# Patient Record
Sex: FEMALE | Race: WHITE | Employment: UNEMPLOYED | URBAN - METROPOLITAN AREA
[De-identification: names, ages, dates, MRNs, and addresses within clinical notes are randomized per-mention and may not be internally consistent; named-entity substitution may affect disease eponyms.]

---

## 2018-04-15 ENCOUNTER — HOSPITAL ENCOUNTER (EMERGENCY)
Age: 53
Discharge: HOME | End: 2018-04-15
Payer: COMMERCIAL

## 2018-04-15 ENCOUNTER — APPOINTMENT (OUTPATIENT)
Dept: GENERAL RADIOLOGY | Age: 53
End: 2018-04-15
Payer: COMMERCIAL

## 2018-04-15 VITALS
HEART RATE: 84 BPM | RESPIRATION RATE: 16 BRPM | HEIGHT: 62 IN | WEIGHT: 160 LBS | SYSTOLIC BLOOD PRESSURE: 110 MMHG | OXYGEN SATURATION: 98 % | BODY MASS INDEX: 29.44 KG/M2 | TEMPERATURE: 98.2 F | DIASTOLIC BLOOD PRESSURE: 66 MMHG

## 2018-04-15 DIAGNOSIS — M25.571 ACUTE RIGHT ANKLE PAIN: Primary | ICD-10-CM

## 2018-04-15 PROCEDURE — 6370000000 HC RX 637 (ALT 250 FOR IP): Performed by: PHYSICIAN ASSISTANT

## 2018-04-15 PROCEDURE — 99283 EMERGENCY DEPT VISIT LOW MDM: CPT

## 2018-04-15 PROCEDURE — 73610 X-RAY EXAM OF ANKLE: CPT

## 2018-04-15 RX ORDER — IBUPROFEN 800 MG/1
800 TABLET ORAL ONCE
Status: COMPLETED | OUTPATIENT
Start: 2018-04-15 | End: 2018-04-15

## 2018-04-15 RX ORDER — IBUPROFEN 800 MG/1
800 TABLET ORAL EVERY 6 HOURS PRN
Qty: 20 TABLET | Refills: 0 | Status: SHIPPED | OUTPATIENT
Start: 2018-04-15 | End: 2020-03-18 | Stop reason: ALTCHOICE

## 2018-04-15 RX ADMIN — IBUPROFEN 800 MG: 800 TABLET ORAL at 22:34

## 2018-04-15 ASSESSMENT — PAIN DESCRIPTION - ORIENTATION: ORIENTATION: RIGHT

## 2018-04-15 ASSESSMENT — PAIN DESCRIPTION - PROGRESSION: CLINICAL_PROGRESSION: GRADUALLY WORSENING

## 2018-04-15 ASSESSMENT — PAIN DESCRIPTION - ONSET: ONSET: SUDDEN

## 2018-04-15 ASSESSMENT — PAIN DESCRIPTION - LOCATION: LOCATION: ANKLE

## 2018-04-15 ASSESSMENT — PAIN SCALES - GENERAL
PAINLEVEL_OUTOF10: 6
PAINLEVEL_OUTOF10: 6

## 2018-04-15 ASSESSMENT — PAIN DESCRIPTION - FREQUENCY: FREQUENCY: CONTINUOUS

## 2018-04-15 ASSESSMENT — PAIN DESCRIPTION - PAIN TYPE: TYPE: ACUTE PAIN

## 2018-04-15 ASSESSMENT — PAIN DESCRIPTION - DESCRIPTORS: DESCRIPTORS: ACHING

## 2018-07-03 ENCOUNTER — APPOINTMENT (OUTPATIENT)
Dept: CT IMAGING | Age: 53
End: 2018-07-03
Payer: COMMERCIAL

## 2018-07-03 ENCOUNTER — HOSPITAL ENCOUNTER (EMERGENCY)
Age: 53
Discharge: HOME | End: 2018-07-03
Attending: EMERGENCY MEDICINE
Payer: COMMERCIAL

## 2018-07-03 VITALS
TEMPERATURE: 97.9 F | HEIGHT: 62 IN | WEIGHT: 164 LBS | OXYGEN SATURATION: 95 % | BODY MASS INDEX: 30.18 KG/M2 | SYSTOLIC BLOOD PRESSURE: 129 MMHG | HEART RATE: 69 BPM | DIASTOLIC BLOOD PRESSURE: 80 MMHG | RESPIRATION RATE: 16 BRPM

## 2018-07-03 DIAGNOSIS — M54.9 UPPER BACK PAIN ON RIGHT SIDE: Primary | ICD-10-CM

## 2018-07-03 DIAGNOSIS — M54.9 MID BACK PAIN: ICD-10-CM

## 2018-07-03 DIAGNOSIS — S29.012A MUSCLE STRAIN OF RIGHT UPPER BACK, INITIAL ENCOUNTER: ICD-10-CM

## 2018-07-03 LAB
ALBUMIN SERPL-MCNC: 4.4 G/DL (ref 3.5–5.2)
ALP BLD-CCNC: 87 U/L (ref 35–104)
ALT SERPL-CCNC: 22 U/L (ref 0–32)
AMORPHOUS: ABNORMAL
ANION GAP SERPL CALCULATED.3IONS-SCNC: 14 MMOL/L (ref 7–16)
AST SERPL-CCNC: 39 U/L (ref 0–31)
BACTERIA: ABNORMAL /HPF
BASOPHILS ABSOLUTE: 0.07 E9/L (ref 0–0.2)
BASOPHILS RELATIVE PERCENT: 0.8 % (ref 0–2)
BILIRUB SERPL-MCNC: 0.2 MG/DL (ref 0–1.2)
BILIRUBIN URINE: NEGATIVE
BLOOD, URINE: NEGATIVE
BUN BLDV-MCNC: 9 MG/DL (ref 6–20)
CALCIUM SERPL-MCNC: 9.6 MG/DL (ref 8.6–10.2)
CHLORIDE BLD-SCNC: 99 MMOL/L (ref 98–107)
CLARITY: ABNORMAL
CO2: 23 MMOL/L (ref 22–29)
COLOR: YELLOW
CREAT SERPL-MCNC: 0.7 MG/DL (ref 0.5–1)
EOSINOPHILS ABSOLUTE: 0.08 E9/L (ref 0.05–0.5)
EOSINOPHILS RELATIVE PERCENT: 0.9 % (ref 0–6)
EPITHELIAL CELLS, UA: ABNORMAL /HPF
GFR AFRICAN AMERICAN: >60
GFR NON-AFRICAN AMERICAN: >60 ML/MIN/1.73
GLUCOSE BLD-MCNC: 111 MG/DL (ref 74–109)
GLUCOSE URINE: NEGATIVE MG/DL
HCT VFR BLD CALC: 43 % (ref 34–48)
HEMOGLOBIN: 14.9 G/DL (ref 11.5–15.5)
IMMATURE GRANULOCYTES #: 0.03 E9/L
IMMATURE GRANULOCYTES %: 0.3 % (ref 0–5)
KETONES, URINE: NEGATIVE MG/DL
LEUKOCYTE ESTERASE, URINE: ABNORMAL
LYMPHOCYTES ABSOLUTE: 1.53 E9/L (ref 1.5–4)
LYMPHOCYTES RELATIVE PERCENT: 17.4 % (ref 20–42)
MCH RBC QN AUTO: 30.5 PG (ref 26–35)
MCHC RBC AUTO-ENTMCNC: 34.7 % (ref 32–34.5)
MCV RBC AUTO: 87.9 FL (ref 80–99.9)
MONOCYTES ABSOLUTE: 0.54 E9/L (ref 0.1–0.95)
MONOCYTES RELATIVE PERCENT: 6.2 % (ref 2–12)
NEUTROPHILS ABSOLUTE: 6.53 E9/L (ref 1.8–7.3)
NEUTROPHILS RELATIVE PERCENT: 74.4 % (ref 43–80)
NITRITE, URINE: NEGATIVE
PDW BLD-RTO: 12.4 FL (ref 11.5–15)
PH UA: 8.5 (ref 5–9)
PLATELET # BLD: 151 E9/L (ref 130–450)
PMV BLD AUTO: 11.4 FL (ref 7–12)
POTASSIUM SERPL-SCNC: 5 MMOL/L (ref 3.5–5)
PROTEIN UA: NEGATIVE MG/DL
RBC # BLD: 4.89 E12/L (ref 3.5–5.5)
RBC UA: ABNORMAL /HPF (ref 0–2)
SODIUM BLD-SCNC: 136 MMOL/L (ref 132–146)
SPECIFIC GRAVITY UA: 1.01 (ref 1–1.03)
TOTAL PROTEIN: 7.8 G/DL (ref 6.4–8.3)
UROBILINOGEN, URINE: 0.2 E.U./DL
WBC # BLD: 8.8 E9/L (ref 4.5–11.5)
WBC UA: ABNORMAL /HPF (ref 0–5)

## 2018-07-03 PROCEDURE — 85025 COMPLETE CBC W/AUTO DIFF WBC: CPT

## 2018-07-03 PROCEDURE — 36415 COLL VENOUS BLD VENIPUNCTURE: CPT

## 2018-07-03 PROCEDURE — 96374 THER/PROPH/DIAG INJ IV PUSH: CPT

## 2018-07-03 PROCEDURE — 80053 COMPREHEN METABOLIC PANEL: CPT

## 2018-07-03 PROCEDURE — 2580000003 HC RX 258: Performed by: NURSE PRACTITIONER

## 2018-07-03 PROCEDURE — 6360000002 HC RX W HCPCS: Performed by: EMERGENCY MEDICINE

## 2018-07-03 PROCEDURE — 81001 URINALYSIS AUTO W/SCOPE: CPT

## 2018-07-03 PROCEDURE — 74176 CT ABD & PELVIS W/O CONTRAST: CPT

## 2018-07-03 PROCEDURE — 99284 EMERGENCY DEPT VISIT MOD MDM: CPT

## 2018-07-03 PROCEDURE — 96375 TX/PRO/DX INJ NEW DRUG ADDON: CPT

## 2018-07-03 RX ORDER — NAPROXEN 500 MG/1
500 TABLET ORAL 2 TIMES DAILY WITH MEALS
Qty: 60 TABLET | Refills: 0 | Status: SHIPPED | OUTPATIENT
Start: 2018-07-03 | End: 2018-09-17 | Stop reason: ALTCHOICE

## 2018-07-03 RX ORDER — MORPHINE SULFATE 4 MG/ML
4 INJECTION, SOLUTION INTRAMUSCULAR; INTRAVENOUS ONCE
Status: COMPLETED | OUTPATIENT
Start: 2018-07-03 | End: 2018-07-03

## 2018-07-03 RX ORDER — METHOCARBAMOL 500 MG/1
500 TABLET, FILM COATED ORAL 4 TIMES DAILY
Qty: 40 TABLET | Refills: 0 | Status: SHIPPED | OUTPATIENT
Start: 2018-07-03 | End: 2018-07-13

## 2018-07-03 RX ORDER — 0.9 % SODIUM CHLORIDE 0.9 %
500 INTRAVENOUS SOLUTION INTRAVENOUS ONCE
Status: COMPLETED | OUTPATIENT
Start: 2018-07-03 | End: 2018-07-03

## 2018-07-03 RX ORDER — ONDANSETRON 2 MG/ML
4 INJECTION INTRAMUSCULAR; INTRAVENOUS ONCE
Status: COMPLETED | OUTPATIENT
Start: 2018-07-03 | End: 2018-07-03

## 2018-07-03 RX ORDER — KETOROLAC TROMETHAMINE 30 MG/ML
15 INJECTION, SOLUTION INTRAMUSCULAR; INTRAVENOUS ONCE
Status: DISCONTINUED | OUTPATIENT
Start: 2018-07-03 | End: 2018-07-03

## 2018-07-03 RX ORDER — KETOROLAC TROMETHAMINE 30 MG/ML
30 INJECTION, SOLUTION INTRAMUSCULAR; INTRAVENOUS ONCE
Status: COMPLETED | OUTPATIENT
Start: 2018-07-03 | End: 2018-07-03

## 2018-07-03 RX ORDER — MORPHINE SULFATE 4 MG/ML
4 INJECTION, SOLUTION INTRAMUSCULAR; INTRAVENOUS ONCE
Status: DISCONTINUED | OUTPATIENT
Start: 2018-07-03 | End: 2018-07-03

## 2018-07-03 RX ORDER — HYDROCODONE BITARTRATE AND ACETAMINOPHEN 5; 325 MG/1; MG/1
1 TABLET ORAL EVERY 6 HOURS PRN
Qty: 10 TABLET | Refills: 0 | Status: SHIPPED | OUTPATIENT
Start: 2018-07-03 | End: 2018-07-06

## 2018-07-03 RX ADMIN — ONDANSETRON 4 MG: 2 INJECTION INTRAMUSCULAR; INTRAVENOUS at 16:45

## 2018-07-03 RX ADMIN — MORPHINE SULFATE 4 MG: 4 INJECTION INTRAVENOUS at 17:27

## 2018-07-03 RX ADMIN — SODIUM CHLORIDE 500 ML: 9 INJECTION, SOLUTION INTRAVENOUS at 16:41

## 2018-07-03 RX ADMIN — KETOROLAC TROMETHAMINE 30 MG: 30 INJECTION, SOLUTION INTRAMUSCULAR at 16:47

## 2018-07-03 ASSESSMENT — PAIN SCALES - GENERAL
PAINLEVEL_OUTOF10: 7
PAINLEVEL_OUTOF10: 8
PAINLEVEL_OUTOF10: 8

## 2018-07-03 ASSESSMENT — PAIN DESCRIPTION - PAIN TYPE: TYPE: ACUTE PAIN

## 2018-07-03 ASSESSMENT — ENCOUNTER SYMPTOMS
BACK PAIN: 1
VOMITING: 0
COUGH: 0
SHORTNESS OF BREATH: 0
ABDOMINAL PAIN: 0
NAUSEA: 1
BLOOD IN STOOL: 0

## 2018-07-03 ASSESSMENT — PAIN DESCRIPTION - PROGRESSION: CLINICAL_PROGRESSION: NOT CHANGED

## 2018-07-03 ASSESSMENT — PAIN DESCRIPTION - LOCATION: LOCATION: FLANK

## 2018-07-03 ASSESSMENT — PAIN DESCRIPTION - FREQUENCY: FREQUENCY: CONTINUOUS

## 2018-07-03 ASSESSMENT — PAIN DESCRIPTION - ORIENTATION: ORIENTATION: RIGHT

## 2018-07-03 ASSESSMENT — PAIN DESCRIPTION - DESCRIPTORS: DESCRIPTORS: CRUSHING

## 2018-07-03 NOTE — ED PROVIDER NOTES
Differential   Result Value Ref Range    WBC 8.8 4.5 - 11.5 E9/L    RBC 4.89 3.50 - 5.50 E12/L    Hemoglobin 14.9 11.5 - 15.5 g/dL    Hematocrit 43.0 34.0 - 48.0 %    MCV 87.9 80.0 - 99.9 fL    MCH 30.5 26.0 - 35.0 pg    MCHC 34.7 (H) 32.0 - 34.5 %    RDW 12.4 11.5 - 15.0 fL    Platelets 864 142 - 946 E9/L    MPV 11.4 7.0 - 12.0 fL    Neutrophils % 74.4 43.0 - 80.0 %    Immature Granulocytes % 0.3 0.0 - 5.0 %    Lymphocytes % 17.4 (L) 20.0 - 42.0 %    Monocytes % 6.2 2.0 - 12.0 %    Eosinophils % 0.9 0.0 - 6.0 %    Basophils % 0.8 0.0 - 2.0 %    Neutrophils # 6.53 1.80 - 7.30 E9/L    Immature Granulocytes # 0.03 E9/L    Lymphocytes # 1.53 1.50 - 4.00 E9/L    Monocytes # 0.54 0.10 - 0.95 E9/L    Eosinophils # 0.08 0.05 - 0.50 E9/L    Basophils # 0.07 0.00 - 0.20 E9/L   Comprehensive Metabolic Panel   Result Value Ref Range    Sodium 136 132 - 146 mmol/L    Potassium 5.0 3.5 - 5.0 mmol/L    Chloride 99 98 - 107 mmol/L    CO2 23 22 - 29 mmol/L    Anion Gap 14 7 - 16 mmol/L    Glucose 111 (H) 74 - 109 mg/dL    BUN 9 6 - 20 mg/dL    CREATININE 0.7 0.5 - 1.0 mg/dL    GFR Non-African American >60 >=60 mL/min/1.73    GFR African American >60     Calcium 9.6 8.6 - 10.2 mg/dL    Total Protein 7.8 6.4 - 8.3 g/dL    Alb 4.4 3.5 - 5.2 g/dL    Total Bilirubin 0.2 0.0 - 1.2 mg/dL    Alkaline Phosphatase 87 35 - 104 U/L    ALT 22 0 - 32 U/L    AST 39 (H) 0 - 31 U/L   Urinalysis   Result Value Ref Range    Color, UA Yellow Straw/Yellow    Clarity, UA SLCLOUDY Clear    Glucose, Ur Negative Negative mg/dL    Bilirubin Urine Negative Negative    Ketones, Urine Negative Negative mg/dL    Specific Gravity, UA 1.015 1.005 - 1.030    Blood, Urine Negative Negative    pH, UA 8.5 5.0 - 9.0    Protein, UA Negative Negative mg/dL    Urobilinogen, Urine 0.2 <2.0 E.U./dL    Nitrite, Urine Negative Negative    Leukocyte Esterase, Urine TRACE (A) Negative   Microscopic Urinalysis   Result Value Ref Range    WBC, UA 1-3 0 - 5 /HPF    RBC, UA NONE 0 - 2 /HPF    Epi Cells FEW /HPF    Bacteria, UA MANY (A) /HPF    Amorphous, UA MODERATE        Radiology:  CT ABDOMEN PELVIS WO CONTRAST Additional Contrast? None   Final Result          ------------------------- NURSING NOTES AND VITALS REVIEWED ---------------------------  Date / Time Roomed:  7/3/2018  3:50 PM  ED Bed Assignment:  12/12    The nursing notes within the ED encounter and vital signs as below have been reviewed. /80   Pulse 69   Temp 97.9 °F (36.6 °C)   Resp 16   Ht 5' 2\" (1.575 m)   Wt 164 lb (74.4 kg)   LMP 05/09/2016 (Within Weeks)   SpO2 95%   BMI 30.00 kg/m²   Oxygen Saturation Interpretation: Normal      ------------------------------------------ PROGRESS NOTES ------------------------------------------  6:06 PM  I have spoken with the patient and discussed todays results, in addition to providing specific details for the plan of care and counseling regarding the diagnosis and prognosis. Their questions are answered at this time and they are agreeable with the plan. I discussed at length with them reasons for immediate return here for re evaluation. They will followup with their primary care physician by calling their office tomorrow. --------------------------------- ADDITIONAL PROVIDER NOTES ---------------------------------  At this time the patient is without objective evidence of an acute process requiring hospitalization or inpatient management. They have remained hemodynamically stable throughout their entire ED visit and are stable for discharge with outpatient follow-up. The plan has been discussed in detail and they are aware of the specific conditions for emergent return, as well as the importance of follow-up. Discharge Medication List as of 7/3/2018  5:55 PM      START taking these medications    Details   HYDROcodone-acetaminophen (NORCO) 5-325 MG per tablet Take 1 tablet by mouth every 6 hours as needed for Pain for up to 3 days.  Intended supply: 3

## 2018-12-17 PROBLEM — E66.9 OBESITY (BMI 30-39.9): Status: ACTIVE | Noted: 2018-12-17

## 2019-07-22 ENCOUNTER — HOSPITAL ENCOUNTER (OUTPATIENT)
Age: 54
Discharge: HOME | End: 2019-07-24
Payer: COMMERCIAL

## 2019-07-22 PROCEDURE — 88305 TISSUE EXAM BY PATHOLOGIST: CPT

## 2020-01-15 ENCOUNTER — OFFICE VISIT (OUTPATIENT)
Dept: BARIATRICS/WEIGHT MGMT | Age: 55
End: 2020-01-15
Payer: COMMERCIAL

## 2020-01-15 VITALS
HEIGHT: 61 IN | WEIGHT: 170.6 LBS | HEART RATE: 70 BPM | TEMPERATURE: 97.9 F | BODY MASS INDEX: 32.21 KG/M2 | DIASTOLIC BLOOD PRESSURE: 89 MMHG | SYSTOLIC BLOOD PRESSURE: 140 MMHG

## 2020-01-15 PROCEDURE — G8417 CALC BMI ABV UP PARAM F/U: HCPCS | Performed by: INTERNAL MEDICINE

## 2020-01-15 PROCEDURE — 99204 OFFICE O/P NEW MOD 45 MIN: CPT | Performed by: INTERNAL MEDICINE

## 2020-01-15 PROCEDURE — 3017F COLORECTAL CA SCREEN DOC REV: CPT | Performed by: INTERNAL MEDICINE

## 2020-01-15 PROCEDURE — 99202 OFFICE O/P NEW SF 15 MIN: CPT

## 2020-01-15 PROCEDURE — G8484 FLU IMMUNIZE NO ADMIN: HCPCS | Performed by: INTERNAL MEDICINE

## 2020-01-15 PROCEDURE — G9899 SCRN MAM PERF RSLTS DOC: HCPCS | Performed by: INTERNAL MEDICINE

## 2020-01-15 PROCEDURE — G8427 DOCREV CUR MEDS BY ELIG CLIN: HCPCS | Performed by: INTERNAL MEDICINE

## 2020-01-15 PROCEDURE — 1036F TOBACCO NON-USER: CPT | Performed by: INTERNAL MEDICINE

## 2020-01-15 NOTE — PROGRESS NOTES
CC -  Low back pain, Obesity    Background -   Medical problems: obesity, pre-HTN, migraines, low back pain, depression, restless leg syndrome  Medications: Gabapentin, Prozac, Maxalt, vaginal estrogen, Saxenda, ibuprofen    Low back pain:  Present x 1yr, bilateral, associated with bilateral sciatica, constant, 7/10, worse with lifting heavy objects, NSAIDs do not help, excess wt may be contributing to it  Obesity: Present since 2016 BMI 32.22, Wt 170.6 lbs; HS grad wt: 105 lbs; Highest wt 180 lbs, Lowest wt 105 lbs; Pattern of wt gain: grad; Wt change past yr: fluctuates 10 lbs by home scales; Most wt lost: 10 lbs (Kevenda, eating right: Other diets: none  Started Saxenda one year ago, but has only taken 1.8 mg/day, no side effects and appetite suppression is 3/10     Diet:    Number of meals per day - 2    Number of snacks per day - 2    Meal volume - 12\" plate, no seconds    Fast food/convenience store - 1x/week    Restaurants (not fast food) - 1-2x/week   Sweets - 7d/week (Tunde cups - 12-16 miniature size/day 44 marry/piece)   Chips - 0d/week   Crackers/pretzels - 1d/week (large pretzel ethel)   Nuts - 0d/week   Peanut Butter - 7d/week (on bread)   Popcorn - 0d/week   Dried fruit - 0d/week   Whole fruit - 7d/week (one piece)   Breakfast cereal - 0d/week   Granola/Protein/Energy bar - 0d/week   Sugar sweetened beverages - 16-24 oz reg soda/week   Protein - No supplements   Fiber - No supplements     Exercise:    Gym membership - none, but has a gym at work    Walking - 30 min/day, 5 day/week    Running - none    Resistance - none    Aerobic class - none    Vital signs: BP (!) 156/86 (Site: Left Upper Arm, Position: Sitting, Cuff Size: Medium Adult)   Pulse 79   Temp 97.9 °F (36.6 °C)   Ht 5' 1\" (1.549 m)   Wt 170 lb 9.6 oz (77.4 kg)   LMP 05/09/2016 (Within Weeks)   BMI 32.23 kg/m²     I spent 45 minutes in a face to face encounter with Red Lance today.    All of this was in education and counseling

## 2020-01-15 NOTE — PATIENT INSTRUCTIONS
Rules:  · Count every calorie every day  · Limit sweets to one day per month  · Eliminate all sugar sweetened beverages  · Limit restaurants (including fast food and food from a convenience store) to one time every two weeks    Requirements:  · Make sure protein intake is at least 60 grams per day (Consider using a protein shake - Nectar, Pure Protein, Premier, Boost Max, Ensure Max, BeneProtein and [employer] (which is lactose-free) are milk-based options; Nectar, Premier Protein Clear, IsoPure Protein Drink, and Protein 2 O are water-based options; Quest (or Cosco, which is cheaper and is ordered on [employer]) and the [employer] 1 protein bars can also be used, but have less protein in them ); other drink options can be found in the protein powder report on the [address] [place] [address] website  · Make sure that fiber intake is at least 22 grams per day. Do this by either eating 12 tablespoons of the original, plain Fiber One cereal every day or 4 tablespoons of Benefiber every day.  Work up to this amount slowly by starting with only one-fourth of the target amount and adding another one-fourth every one or two weeks  · Take one multivitamin every day    Targets:  · Limit calorie intake to 1200 calories/day  · Walk 30 minutes daily  · Establish 16 hours of food-free periods each day - no period can be less than 1 hours, the periods need to be the same every day for days that are the same (for example, workdays would have one set of food free periods and weekends would have another set of days), the usual sleep time should be included (the \"usual\" time you go to bed until the \"usual\" time you get up)  · Avoid eating 2 hours within bedtime    Tips:  · Do not eat outside of the dining room or the kitchen  · Do not eat while watching TV, videos, working on the computer or using a smart phone  · Never eat food out of the bag or container (unless it is a single serving bag)    Medications:  · Increase the Saxenda daily dose by 0.6 mg every week until reaching 3 mg/daily    Return to see Dr. Joao Nascimento in 2 months

## 2020-03-18 ENCOUNTER — OFFICE VISIT (OUTPATIENT)
Dept: BARIATRICS/WEIGHT MGMT | Age: 55
End: 2020-03-18
Payer: COMMERCIAL

## 2020-03-18 VITALS
BODY MASS INDEX: 30.66 KG/M2 | HEIGHT: 61 IN | SYSTOLIC BLOOD PRESSURE: 143 MMHG | TEMPERATURE: 97.6 F | HEART RATE: 71 BPM | WEIGHT: 162.4 LBS | DIASTOLIC BLOOD PRESSURE: 84 MMHG

## 2020-03-18 PROCEDURE — G8484 FLU IMMUNIZE NO ADMIN: HCPCS | Performed by: INTERNAL MEDICINE

## 2020-03-18 PROCEDURE — G8427 DOCREV CUR MEDS BY ELIG CLIN: HCPCS | Performed by: INTERNAL MEDICINE

## 2020-03-18 PROCEDURE — 99213 OFFICE O/P EST LOW 20 MIN: CPT | Performed by: INTERNAL MEDICINE

## 2020-03-18 PROCEDURE — 1036F TOBACCO NON-USER: CPT | Performed by: INTERNAL MEDICINE

## 2020-03-18 PROCEDURE — 3017F COLORECTAL CA SCREEN DOC REV: CPT | Performed by: INTERNAL MEDICINE

## 2020-03-18 PROCEDURE — G8417 CALC BMI ABV UP PARAM F/U: HCPCS | Performed by: INTERNAL MEDICINE

## 2020-03-18 PROCEDURE — G9899 SCRN MAM PERF RSLTS DOC: HCPCS | Performed by: INTERNAL MEDICINE

## 2020-03-18 PROCEDURE — 99211 OFF/OP EST MAY X REQ PHY/QHP: CPT | Performed by: INTERNAL MEDICINE

## 2020-03-18 NOTE — PATIENT INSTRUCTIONS
Rules:  · Count every calorie every day  · Limit sweets to one day per month  · Eliminate all sugar sweetened beverages  · Limit restaurants (including fast food and food from a convenience store) to one time every two weeks    Requirements:  · Make sure protein intake is at least 60 grams per day (Consider using a protein shake - Nectar, Pure Protein, Premier, Boost Max, Ensure Max, BeneProtein and [employer] (which is lactose-free) are milk-based options; Nectar, Premier Protein Clear, IsoPure Protein Drink, and Protein 2 O are water-based options; Quest (or Cosco, which is cheaper and is ordered on [employer]) and the [employer] 1 protein bars can also be used, but have less protein in them ); other drink options can be found in the protein powder report on the [address] [place] [address] website  · Make sure that fiber intake is at least 22 grams per day. Do this by either eating 12 tablespoons of the original, plain Fiber One cereal every day or 4 tablespoons of Benefiber every day.  Work up to this amount slowly by starting with only one-fourth of the target amount and adding another one-fourth every one or two weeks  · Take one multivitamin every day    Targets:  · Limit calorie intake to 1200 calories/day  · Walk 30 minutes daily  · Establish 16 hours of food-free periods each day - no period can be less than 1 hours, the periods need to be the same every day for days that are the same (for example, workdays would have one set of food free periods and weekends would have another set of days), the usual sleep time should be included (the \"usual\" time you go to bed until the \"usual\" time you get up)  · Avoid eating 2 hours within bedtime    Tips:  · Do not eat outside of the dining room or the kitchen  · Do not eat while watching TV, videos, working on the computer or using a smart phone  · Never eat food out of the bag or container (unless it is a single serving bag)    Medications:  · Saxenda 3 mg/daily

## 2020-06-28 ENCOUNTER — HOSPITAL ENCOUNTER (EMERGENCY)
Age: 55
Discharge: HOME | End: 2020-06-28
Attending: EMERGENCY MEDICINE
Payer: COMMERCIAL

## 2020-06-28 VITALS
DIASTOLIC BLOOD PRESSURE: 80 MMHG | SYSTOLIC BLOOD PRESSURE: 134 MMHG | TEMPERATURE: 98.9 F | HEART RATE: 80 BPM | HEIGHT: 61 IN | RESPIRATION RATE: 16 BRPM | OXYGEN SATURATION: 98 % | BODY MASS INDEX: 30.21 KG/M2 | WEIGHT: 160 LBS

## 2020-06-28 PROCEDURE — 96374 THER/PROPH/DIAG INJ IV PUSH: CPT

## 2020-06-28 PROCEDURE — 6360000002 HC RX W HCPCS: Performed by: EMERGENCY MEDICINE

## 2020-06-28 PROCEDURE — 99283 EMERGENCY DEPT VISIT LOW MDM: CPT

## 2020-06-28 PROCEDURE — 96375 TX/PRO/DX INJ NEW DRUG ADDON: CPT

## 2020-06-28 RX ORDER — METHYLPREDNISOLONE SODIUM SUCCINATE 125 MG/2ML
125 INJECTION, POWDER, LYOPHILIZED, FOR SOLUTION INTRAMUSCULAR; INTRAVENOUS ONCE
Status: COMPLETED | OUTPATIENT
Start: 2020-06-28 | End: 2020-06-28

## 2020-06-28 RX ORDER — EPINEPHRINE 0.3 MG/.3ML
INJECTION SUBCUTANEOUS
Qty: 2 EACH | Refills: 2 | Status: SHIPPED | OUTPATIENT
Start: 2020-06-28

## 2020-06-28 RX ORDER — DIPHENHYDRAMINE HYDROCHLORIDE 50 MG/ML
25 INJECTION INTRAMUSCULAR; INTRAVENOUS ONCE
Status: COMPLETED | OUTPATIENT
Start: 2020-06-28 | End: 2020-06-28

## 2020-06-28 RX ADMIN — METHYLPREDNISOLONE SODIUM SUCCINATE 125 MG: 125 INJECTION, POWDER, FOR SOLUTION INTRAMUSCULAR; INTRAVENOUS at 14:52

## 2020-06-28 RX ADMIN — DIPHENHYDRAMINE HYDROCHLORIDE 25 MG: 50 INJECTION, SOLUTION INTRAMUSCULAR; INTRAVENOUS at 14:52

## 2020-06-28 NOTE — ED PROVIDER NOTES
diphenhydrAMINE (BENADRYL) injection 25 mg (25 mg Intravenous Given 6/28/20 9525)           The cardiac monitor revealed normal sinus rhythm with a heart rate in the 80s as interpreted by me. The cardiac monitor was ordered secondary to the patient's anaphylaxis and to monitor the patient for dysrhythmia. CPT [id number]       I, Dr. Lashay Pang, am the primary provider of record    Medical Decision Making:   Anaphylaxis, she treated with epinephrine prior to arrival.  She is already improving. IV fluids, Benadryl, steroids given. All of her symptoms have resolved. She was observed in the ED without any return of symptoms. She did not want to wait any longer and wanted to leave. I discussed that typically I watch people for several hours after receiving epinephrine as there is a chance of a biphasic reaction in which her anaphylaxis could return. She understands this but still wants to leave. She says she will return or go to the closest facility if this happens again. Again I discussed that I prefer her to stay but she does not want to do so and understands the risks. Oxygen Saturation Interpretation: 98 % on RA. Normal      Re-Evaluations:     Improving  All hives gone  Breathing normally  Feeling fine, wants to go home  Went to the bathroom without issues  Not short of breath      This patient's ED course included: a personal history and physicial examination, re-evaluation prior to disposition, multiple bedside re-evaluations, IV medications, cardiac monitoring, continuous pulse oximetry and complex medical decision making and emergency management    This patient has remained hemodynamically stable during their ED course. Counseling: The emergency provider has spoken with the patient and spouse/SO and discussed todays results, in addition to providing specific details for the plan of care and counseling regarding the diagnosis and prognosis.   Questions are answered at this time and they are

## 2020-07-06 ENCOUNTER — OFFICE VISIT (OUTPATIENT)
Dept: BARIATRICS/WEIGHT MGMT | Age: 55
End: 2020-07-06
Payer: COMMERCIAL

## 2020-07-06 VITALS
DIASTOLIC BLOOD PRESSURE: 79 MMHG | SYSTOLIC BLOOD PRESSURE: 141 MMHG | BODY MASS INDEX: 29.07 KG/M2 | HEIGHT: 61 IN | TEMPERATURE: 98.2 F | HEART RATE: 78 BPM | WEIGHT: 154 LBS

## 2020-07-06 PROCEDURE — 99213 OFFICE O/P EST LOW 20 MIN: CPT | Performed by: INTERNAL MEDICINE

## 2020-07-06 PROCEDURE — G8427 DOCREV CUR MEDS BY ELIG CLIN: HCPCS | Performed by: INTERNAL MEDICINE

## 2020-07-06 PROCEDURE — 1036F TOBACCO NON-USER: CPT | Performed by: INTERNAL MEDICINE

## 2020-07-06 PROCEDURE — 3017F COLORECTAL CA SCREEN DOC REV: CPT | Performed by: INTERNAL MEDICINE

## 2020-07-06 PROCEDURE — 99211 OFF/OP EST MAY X REQ PHY/QHP: CPT

## 2020-07-06 PROCEDURE — G9899 SCRN MAM PERF RSLTS DOC: HCPCS | Performed by: INTERNAL MEDICINE

## 2020-07-06 PROCEDURE — G8417 CALC BMI ABV UP PARAM F/U: HCPCS | Performed by: INTERNAL MEDICINE

## 2020-07-06 NOTE — PATIENT INSTRUCTIONS
Rules:  · Count every calorie every day  · Limit sweets to one day per month  · Eliminate all sugar sweetened beverages  · Limit restaurants (including fast food and food from a convenience store) to one time every two weeks    Requirements:  · Make sure protein intake is at least 60 grams per day (Consider using a protein shake - Nectar, Pure Protein, Premier, Boost Max, Ensure Max, BeneProtein and [employer] (which is lactose-free) are milk-based options; Nectar, Premier Protein Clear, IsoPure Protein Drink, and Protein 2 O are water-based options; Quest (or Cosco, which is cheaper and is ordered on [address] [facility] [address]) and the [employer] 1 protein bars can also be used, but have less protein in them ); other drink options can be found in the protein powder report on the [address] [place] [address] website  · Make sure that fiber intake is at least 22 grams per day. Do this by either eating 12 tablespoons of the original, plain Fiber One cereal every day or 4 tablespoons of Benefiber every day.  Work up to this amount slowly by starting with only one-fourth of the target amount and adding another one-fourth every one or two weeks  · Take one multivitamin every day    Targets:  · Limit calorie intake to 1200 calories/day  · Walk 30 minutes daily  · Establish 16 hours of food-free periods each day - no period can be less than 1 hours, the periods need to be the same every day for days that are the same (for example, workdays would have one set of food free periods and weekends would have another set of days), the usual sleep time should be included (the \"usual\" time you go to bed until the \"usual\" time you get up)  · Avoid eating 2 hours within bedtime    Tips:  · Do not eat outside of the dining room or the kitchen  · Do not eat while watching TV, videos, working on the computer or using a smart phone  · Never eat food out of the bag or container (unless it is a single serving bag)    Medications:  · Saxenda 3 mg/daily

## 2020-07-06 NOTE — PROGRESS NOTES
CC -   Low back pain, Obesity    Background -   Pt returns for follow up of last visit with me on 1/15/2020    Low back pain:  Present x 1yr, bilateral, associated with bilateral sciatica, constant, 7/10, worse with lifting heavy objects, NSAIDs do not help, excess wt may be contributing to it  Obesity: Present since 2016 BMI 32.22, Wt 170.6 lbs; HS grad wt: 105 lbs; Highest wt 180 lbs, Lowest wt 105 lbs; Pattern of wt gain: grad; Wt change past yr: fluctuates 10 lbs by home scales; Most wt lost: 10 lbs (Saxenda, eating right: Other diets: none  Taking Saxenda 3mg daily     Diet:    Number of meals per day - 2    Number of snacks per day - 2    Meal volume - 12\" plate, no seconds    Fast food/convenience store - 1x/week    Restaurants (not fast food) - 1-2x/week   Sweets - 7d/week (Tunde cups - 12-16 miniature size/day 44 marry/piece)   Chips - 0d/week   Crackers/pretzels - 1d/week (large pretzel ethel)   Nuts - 0d/week   Peanut Butter - 7d/week (on bread)   Popcorn - 0d/week   Dried fruit - 0d/week   Whole fruit - 7d/week (one piece)   Breakfast cereal - 0d/week   Granola/Protein/Energy bar - 0d/week   Sugar sweetened beverages - 16-24 oz reg soda/week  ______________________    [facility] [place] [facility] -  Medical problems: obesity, pre-HTN, migraines, low back pain, depression, restless leg syndrome  Medications: Gabapentin, Prozac, Maxalt, vaginal estrogen, Saxenda, ibuprofen    ROS -  Gen: no calories  Pulm: no cough    PE -  Gen : BP (!) 141/79 (Site: Left Upper Arm, Position: Sitting, Cuff Size: Medium Adult)   Pulse 78   Temp 98.2 °F (36.8 °C) (Temporal)   Ht 5' 1\" (1.549 m)   Wt 154 lb (69.9 kg)   LMP 05/09/2016 (Within Weeks)   BMI 29.10 kg/m²    WN, WD, NAD  Lung: Nml resp effort  Psych: Normal mood   Full affect  Neuro: Moves all ext well  ______________________      HPI & A/P -   Problem 1  - Low back pain   HPI   - Presently had 0/10 pain.  She attributes this to her weight reduction  Assessment  - Improving  Plan   - Cont with the prescribed weight loss regimen    Problem 2  - Obesity  HPI   - Weight  Date      170.6 lbs 1/15/20      162.4 lbs 3/18/20      154.0 lbs 7/06/20      Total wt loss to date: 16.6 lbs      Counting calories 7d/week, 1200 marry/d    Eating restaurant food once every week (6\" pizza)    Eating sweets 1x/2 weeks    Walking 30 min/day    Taking 3 mg Saxenda daily. Appetite suppression 9/10. No side effects - no GI effects (mild nausea cont's) or palpitations    Assessment  - Improving; we discussed the importance of elimination  Plan   -   Rules:  · Count every calorie every day  · Limit sweets to one day per month  · Eliminate all sugar sweetened beverages  · Limit restaurants (including fast food and food from a convenience store) to one time every two weeks    Requirements:  · Make sure protein intake is at least 60 grams per day (Consider using a protein shake - Nectar, Pure Protein, Premier, Boost Max, Ensure Max, BeneProtein and [employer] (which is lactose-free) are milk-based options; Nectar, Premier Protein Clear, IsoPure Protein Drink, and Protein 2 O are water-based options; Quest (or Cosco, which is cheaper and is ordered on [address] [facility] [address]) and the [employer] 1 protein bars can also be used, but have less protein in them ); other drink options can be found in the protein powder report on the [address] [place] [address] website  · Make sure that fiber intake is at least 22 grams per day. Do this by either eating 12 tablespoons of the original, plain Fiber One cereal every day or 4 tablespoons of Benefiber every day.  Work up to this amount slowly by starting with only one-fourth of the target amount and adding another one-fourth every one or two weeks  · Take one multivitamin every day    Targets:  · Limit calorie intake to 1200 calories/day  · Walk 30 minutes daily  · Establish 16 hours of food-free periods each day - no period can be less than 1 hours, the periods need to be the same every day for days that are the same (for example, workdays would have one set of food free periods and weekends would have another set of days), the usual sleep time should be included (the \"usual\" time you go to bed until the \"usual\" time you get up)  · Avoid eating 2 hours within bedtime    Tips:  · Do not eat outside of the dining room or the kitchen  · Do not eat while watching TV, videos, working on the computer or using a smart phone  · Never eat food out of the bag or container (unless it is a single serving bag)    Medications:  · Saxenda 3 mg/daily    Follow up  Return to see me in 6 weeks (per pt request)      Barrie Fraser MD   [facility]  7/6/20

## 2020-08-06 DIAGNOSIS — G89.29 CHRONIC BILATERAL LOW BACK PAIN WITH BILATERAL SCIATICA: ICD-10-CM

## 2020-08-06 DIAGNOSIS — E66.09 CLASS 1 OBESITY DUE TO EXCESS CALORIES WITHOUT SERIOUS COMORBIDITY WITH BODY MASS INDEX (BMI) OF 32.0 TO 32.9 IN ADULT: ICD-10-CM

## 2020-08-06 DIAGNOSIS — M54.42 CHRONIC BILATERAL LOW BACK PAIN WITH BILATERAL SCIATICA: ICD-10-CM

## 2020-08-06 DIAGNOSIS — M54.41 CHRONIC BILATERAL LOW BACK PAIN WITH BILATERAL SCIATICA: ICD-10-CM

## 2020-08-06 RX ORDER — LIRAGLUTIDE 6 MG/ML
INJECTION, SOLUTION SUBCUTANEOUS
Qty: 5 PEN | Refills: 5 | Status: SHIPPED
Start: 2020-08-06 | End: 2021-02-13

## 2020-08-17 ENCOUNTER — OFFICE VISIT (OUTPATIENT)
Dept: BARIATRICS/WEIGHT MGMT | Age: 55
End: 2020-08-17
Payer: COMMERCIAL

## 2020-08-17 VITALS
WEIGHT: 157 LBS | SYSTOLIC BLOOD PRESSURE: 141 MMHG | BODY MASS INDEX: 29.64 KG/M2 | HEART RATE: 75 BPM | DIASTOLIC BLOOD PRESSURE: 83 MMHG | HEIGHT: 61 IN | TEMPERATURE: 97.6 F

## 2020-08-17 PROCEDURE — G8428 CUR MEDS NOT DOCUMENT: HCPCS | Performed by: INTERNAL MEDICINE

## 2020-08-17 PROCEDURE — 3017F COLORECTAL CA SCREEN DOC REV: CPT | Performed by: INTERNAL MEDICINE

## 2020-08-17 PROCEDURE — 99211 OFF/OP EST MAY X REQ PHY/QHP: CPT

## 2020-08-17 PROCEDURE — G9899 SCRN MAM PERF RSLTS DOC: HCPCS | Performed by: INTERNAL MEDICINE

## 2020-08-17 PROCEDURE — 1036F TOBACCO NON-USER: CPT | Performed by: INTERNAL MEDICINE

## 2020-08-17 PROCEDURE — 99214 OFFICE O/P EST MOD 30 MIN: CPT | Performed by: INTERNAL MEDICINE

## 2020-08-17 PROCEDURE — G8417 CALC BMI ABV UP PARAM F/U: HCPCS | Performed by: INTERNAL MEDICINE

## 2020-08-17 NOTE — PROGRESS NOTES
prescribed weight loss regimen    Problem 2  - Obesity  HPI   - Weight  Date      170.6 lbs 1/15/20      162.4 lbs 3/18/20      154.0 lbs 7/06/20      157.0 lbs 8/17/20      Total wt loss to date: 13.6 lbs      Counting calories 7d/week, 1200 marry/d; however, stopped 10 days/ago when going on vacation for 1 week    Ate restaurant food daily while on vacation    Eating sweets 1x/2 weeks; did not veer from this during vacation    Walking 30 min/day    Taking 3 mg Saxenda daily. Appetite suppression 5/10. No side effects - no GI effects or palpitations    Assessment  - Improving; we discussed the importance of elimination  Plan   -   Rules:  · Count every calorie every day  · Limit sweets to one day per month  · Eliminate all sugar sweetened beverages  · Limit restaurants (including fast food and food from a convenience store) to one time every two weeks    Requirements:  · Make sure protein intake is at least 60 grams per day (Consider using a protein shake - Nectar, Pure Protein, Premier, Boost Max, Ensure Max, BeneProtein and [employer] (which is lactose-free) are milk-based options; Nectar, Premier Protein Clear, IsoPure Protein Drink, and Protein 2 O are water-based options; Quest (or Cosco, which is cheaper and is ordered on [address] [facility] [address]) and the [facility] 1 protein bars can also be used, but have less protein in them ); other drink options can be found in the protein powder report on the [address] [place] [address] website  · Make sure that fiber intake is at least 22 grams per day. Do this by either eating 12 tablespoons of the original, plain Fiber One cereal every day or 4 tablespoons of Benefiber every day.  Work up to this amount slowly by starting with only one-fourth of the target amount and adding another one-fourth every one or two weeks  · Take one multivitamin every day    Targets:  · Limit calorie intake to 1200 calories/day  · Walk 30 minutes daily  · Establish 16 hours of food-free periods each day - no period can be

## 2020-08-17 NOTE — PATIENT INSTRUCTIONS
Rules:  · Count every calorie every day  · Limit sweets to one day per month  · Eliminate all sugar sweetened beverages  · Limit restaurants (including fast food and food from a convenience store) to one time every two weeks    Requirements:  · Make sure protein intake is at least 60 grams per day (Consider using a protein shake - Nectar, Pure Protein, Premier, Boost Max, Ensure Max, BeneProtein and [employer] (which is lactose-free) are milk-based options; Nectar, Premier Protein Clear, IsoPure Protein Drink, and Protein 2 O are water-based options; Quest (or Cosco, which is cheaper and is ordered on [address] [facility] [address]) and the [employer] 1 protein bars can also be used, but have less protein in them ); other drink options can be found in the protein powder report on the [address] [place] [address] website  · Make sure that fiber intake is at least 22 grams per day. Do this by either eating 12 tablespoons of the original, plain Fiber One cereal every day or 4 tablespoons of Benefiber every day.  Work up to this amount slowly by starting with only one-fourth of the target amount and adding another one-fourth every one or two weeks  · Take one multivitamin every day    Targets:  · Limit calorie intake to 1200 calories/day  · Walk 30 minutes daily  · Establish 16 hours of food-free periods each day - no period can be less than 1 hours, the periods need to be the same every day for days that are the same (for example, workdays would have one set of food free periods and weekends would have another set of days), the usual sleep time should be included (the \"usual\" time you go to bed until the \"usual\" time you get up)  · Avoid eating 2 hours within bedtime    Tips:  · Do not eat outside of the dining room or the kitchen  · Do not eat while watching TV, videos, working on the computer or using a smart phone  · Never eat food out of the bag or container (unless it is a single serving bag)    Medications:  · Saxenda 3 mg/daily

## 2020-09-30 ENCOUNTER — OFFICE VISIT (OUTPATIENT)
Dept: BARIATRICS/WEIGHT MGMT | Age: 55
End: 2020-09-30
Payer: COMMERCIAL

## 2020-09-30 VITALS
BODY MASS INDEX: 29.11 KG/M2 | WEIGHT: 154.2 LBS | HEART RATE: 75 BPM | TEMPERATURE: 96.4 F | SYSTOLIC BLOOD PRESSURE: 142 MMHG | HEIGHT: 61 IN | DIASTOLIC BLOOD PRESSURE: 72 MMHG

## 2020-09-30 PROCEDURE — 99211 OFF/OP EST MAY X REQ PHY/QHP: CPT

## 2020-09-30 PROCEDURE — 3017F COLORECTAL CA SCREEN DOC REV: CPT | Performed by: INTERNAL MEDICINE

## 2020-09-30 PROCEDURE — G9899 SCRN MAM PERF RSLTS DOC: HCPCS | Performed by: INTERNAL MEDICINE

## 2020-09-30 PROCEDURE — 99213 OFFICE O/P EST LOW 20 MIN: CPT | Performed by: INTERNAL MEDICINE

## 2020-09-30 PROCEDURE — G8427 DOCREV CUR MEDS BY ELIG CLIN: HCPCS | Performed by: INTERNAL MEDICINE

## 2020-09-30 PROCEDURE — 1036F TOBACCO NON-USER: CPT | Performed by: INTERNAL MEDICINE

## 2020-09-30 PROCEDURE — G8417 CALC BMI ABV UP PARAM F/U: HCPCS | Performed by: INTERNAL MEDICINE

## 2020-09-30 NOTE — PROGRESS NOTES
CC -   Low back pain, Obesity    Background -   Last visit: 08/17/20  First visit: 01/15/20    Low back pain:  Present x 1yr, bilateral, associated with bilateral sciatica, constant, 7/10, worse with lifting heavy objects, NSAIDs do not help, excess wt may be contributing to it  Obesity: Present since 2016 BMI 32.22, Wt 170.6 lbs; HS grad wt: 105 lbs; Highest wt 180 lbs, Lowest wt 105 lbs; Pattern of wt gain: grad; Wt change past yr: fluctuates 10 lbs by home scales; Most wt lost: 10 lbs (Saxenda, eating right: Other diets: none  Taking Saxenda 3mg daily    Initial Diet:    Number of meals per day - 2    Number of snacks per day - 2    Meal volume - 12\" plate, no seconds    Fast food/convenience store - 1x/week    Restaurants (not fast food) - 1-2x/week   Sweets - 7d/week (Tunde cups - 12-16 miniature size/day 44 marry/piece)   Chips - 0d/week   Crackers/pretzels - 1d/week (large pretzel ethel)   Nuts - 0d/week   Peanut Butter - 7d/week (on bread)   Popcorn - 0d/week   Dried fruit - 0d/week   Whole fruit - 7d/week (one piece)   Breakfast cereal - 0d/week   Granola/Protein/Energy bar - 0d/week   Sugar sweetened beverages - 16-24 oz reg soda/week  ______________________    [facility] BEHAVIORAL [facility] -  Medical problems: obesity, pre-HTN, migraines, low back pain, depression, restless leg syndrome  Medications: Gabapentin, Prozac, Maxalt, vaginal estrogen, Saxenda, ibuprofen    ROS -  Gen: no fever  Pulm: no cough    PE -  Gen : BP (!) 142/72 (Site: Left Upper Arm, Position: Sitting, Cuff Size: Medium Adult)   Pulse 75   Temp 96.4 °F (35.8 °C) (Temporal)   Ht 5' 1\" (1.549 m)   Wt 154 lb 3.2 oz (69.9 kg)   LMP 05/09/2016 (Within Weeks)   BMI 29.14 kg/m²    WN, WD, NAD  Lung: Nml resp effort  Psych: Normal mood   Full affect  Neuro: Moves all ext well  ______________________      HPI & A/P -   Problem 1  - Low back pain   HPI   - Conts to be 0-5/10 pain.  The 5/10 occurs with heavy exertion   Assessment  - Improving  Plan   - Cont with the prescribed weight loss regimen    Problem 2  - Obesity  HPI   - Weight  Date      170.6 lbs 1/15/20      162.4 lbs 3/18/20      154.0 lbs 7/06/20      157.0 lbs 8/17/20      154.2 lbs 9/30/20      Total wt loss to date: 16.4 lbs      Counting calories 7d/week, 1600 marry/d;    Eating sweets 1x/2 weeks    Not walking, but working outside more    Taking 3 mg Saxenda daily. Appetite suppression 8/10. No side effects - no GI effects or palpitations  She is content with losing 2 lbs/month. Her DEN are likely 1700 marry. Therefore will adjust her calorie limit to a higher amount  Assessment  - Improving; we discussed the importance of defining the frequency of her trouble foods  Plan   -   Rules:  · Count every calorie every day  · Limit sweets to one day per month  · Eliminate all sugar sweetened beverages  · Limit restaurants (including fast food and food from a convenience store) to one time every two weeks    Requirements:  · Make sure protein intake is at least 60 grams per day (Consider using a protein shake - Nectar, Pure Protein, Premier, Boost Max, Ensure Max, BeneProtein and [employer] (which is lactose-free) are milk-based options; Nectar, Premier Protein Clear, IsoPure Protein Drink, and Protein 2 O are water-based options; Quest (or Cosco, which is cheaper and is ordered on [address] [facility] [address]) and the Oh Yeah 1 protein bars can also be used, but have less protein in them ); other drink options can be found in the protein powder report on the [address] [place] [address] website  · Make sure that fiber intake is at least 22 grams per day. Do this by either eating 12 tablespoons of the original, plain Fiber One cereal every day or 4 tablespoons of Benefiber every day.  Work up to this amount slowly by starting with only one-fourth of the target amount and adding another one-fourth every one or two weeks  · Take one multivitamin every day    Targets:  · Limit calorie intake to 1500 calories/day on days when walking and not, then 1400  · Walk 30 minutes daily  · Establish 16 hours of food-free periods each day - no period can be less than 1 hours, the periods need to be the same every day for days that are the same (for example, workdays would have one set of food free periods and weekends would have another set of days), the usual sleep time should be included (the \"usual\" time you go to bed until the \"usual\" time you get up)  · Avoid eating 2 hours within bedtime    Tips:  · Do not eat outside of the dining room or the kitchen  · Do not eat while watching TV, videos, working on the computer or using a smart phone  · Never eat food out of the bag or container (unless it is a single serving bag)    Medications:  · Saxenda 3 mg/daily    Follow up  Return to see me in 6 weeks (per pt request)        Chary Real MD   [facility] Endocrinology & Obesity  9/30/20

## 2020-09-30 NOTE — PATIENT INSTRUCTIONS
Rules:  · Count every calorie every day  · Limit sweets to one day per month  · Eliminate all sugar sweetened beverages  · Limit restaurants (including fast food and food from a convenience store) to one time every two weeks    Requirements:  · Make sure protein intake is at least 60 grams per day (Consider using a protein shake - Nectar, Pure Protein, Premier, Boost Max, Ensure Max, BeneProtein and [employer] (which is lactose-free) are milk-based options; Nectar, Premier Protein Clear, IsoPure Protein Drink, and Protein 2 O are water-based options; Quest (or Cosco, which is cheaper and is ordered on [address] [facility] [address]) and the Oh [employer] 1 protein bars can also be used, but have less protein in them ); other drink options can be found in the protein powder report on the [address] [place] [address] website  · Make sure that fiber intake is at least 22 grams per day. Do this by either eating 12 tablespoons of the original, plain Fiber One cereal every day or 4 tablespoons of Benefiber every day.  Work up to this amount slowly by starting with only one-fourth of the target amount and adding another one-fourth every one or two weeks  · Take one multivitamin every day    Targets:  · Limit calorie intake to 1500 calories/day on days when walking and not, then 1400  · Walk 30 minutes daily  · Establish 16 hours of food-free periods each day - no period can be less than 1 hours, the periods need to be the same every day for days that are the same (for example, workdays would have one set of food free periods and weekends would have another set of days), the usual sleep time should be included (the \"usual\" time you go to bed until the \"usual\" time you get up)  · Avoid eating 2 hours within bedtime    Tips:  · Do not eat outside of the dining room or the kitchen  · Do not eat while watching TV, videos, working on the computer or using a smart phone  · Never eat food out of the bag or container (unless it is a single serving bag)    Medications:  · Saxenda 3 mg/daily    Follow up  Return to see me in 6 weeks

## 2020-11-03 PROBLEM — E66.9 OBESITY: Status: RESOLVED | Noted: 2020-11-03 | Resolved: 2020-11-03

## 2020-11-18 ENCOUNTER — TELEPHONE (OUTPATIENT)
Dept: BARIATRICS/WEIGHT MGMT | Age: 55
End: 2020-11-18

## 2020-11-18 ENCOUNTER — OFFICE VISIT (OUTPATIENT)
Dept: BARIATRICS/WEIGHT MGMT | Age: 55
End: 2020-11-18
Payer: COMMERCIAL

## 2020-11-18 VITALS
HEART RATE: 75 BPM | DIASTOLIC BLOOD PRESSURE: 90 MMHG | BODY MASS INDEX: 29.45 KG/M2 | TEMPERATURE: 96.4 F | SYSTOLIC BLOOD PRESSURE: 145 MMHG | HEIGHT: 61 IN | WEIGHT: 156 LBS

## 2020-11-18 PROCEDURE — 3017F COLORECTAL CA SCREEN DOC REV: CPT | Performed by: INTERNAL MEDICINE

## 2020-11-18 PROCEDURE — G8428 CUR MEDS NOT DOCUMENT: HCPCS | Performed by: INTERNAL MEDICINE

## 2020-11-18 PROCEDURE — 1036F TOBACCO NON-USER: CPT | Performed by: INTERNAL MEDICINE

## 2020-11-18 PROCEDURE — 99213 OFFICE O/P EST LOW 20 MIN: CPT | Performed by: INTERNAL MEDICINE

## 2020-11-18 PROCEDURE — G8484 FLU IMMUNIZE NO ADMIN: HCPCS | Performed by: INTERNAL MEDICINE

## 2020-11-18 PROCEDURE — G9899 SCRN MAM PERF RSLTS DOC: HCPCS | Performed by: INTERNAL MEDICINE

## 2020-11-18 PROCEDURE — G8417 CALC BMI ABV UP PARAM F/U: HCPCS | Performed by: INTERNAL MEDICINE

## 2020-11-18 PROCEDURE — 99211 OFF/OP EST MAY X REQ PHY/QHP: CPT

## 2020-11-18 NOTE — PROGRESS NOTES
CC -   Low back pain, Obesity    Background -   Last visit: 08/17/20  First visit: 01/15/20    Low back pain:  Present x 1yr, bilateral, associated with bilateral sciatica, constant, 7/10, worse with lifting heavy objects, NSAIDs do not help, excess wt may be contributing to it  Obesity: Present since 2016 BMI 32.22, Wt 170.6 lbs; HS grad wt: 105 lbs; Highest wt 180 lbs, Lowest wt 105 lbs; Pattern of wt gain: grad; Wt change past yr: fluctuates 10 lbs by home scales; Most wt lost: 10 lbs (Saxenda, eating right: Other diets: none  Taking Saxenda 3mg daily    Initial Diet:    Number of meals per day - 2    Number of snacks per day - 2    Meal volume - 12\" plate, no seconds    Fast food/convenience store - 1x/week    Restaurants (not fast food) - 1-2x/week   Sweets - 7d/week (Tunde cups - 12-16 miniature size/day 44 marry/piece)   Chips - 0d/week   Crackers/pretzels - 1d/week (large pretzel ethel)   Nuts - 0d/week   Peanut Butter - 7d/week (on bread)   Popcorn - 0d/week   Dried fruit - 0d/week   Whole fruit - 7d/week (one piece)   Breakfast cereal - 0d/week   Granola/Protein/Energy bar - 0d/week   Sugar sweetened beverages - 16-24 oz reg soda/week  ______________________    [facility] BEHAVIORAL [facility] -  Medical problems: obesity, pre-HTN, migraines, low back pain, depression, restless leg syndrome  Medications: Gabapentin, Prozac, Maxalt, vaginal estrogen, Saxenda, ibuprofen    ROS -  Gen: no fever  Pulm: no cough    PE -  Gen : BP (!) 145/90 (Site: Left Upper Arm, Position: Sitting, Cuff Size: Medium Adult)   Pulse 75   Temp 96.4 °F (35.8 °C) (Temporal)   Ht 5' 1\" (1.549 m)   Wt 156 lb (70.8 kg)   LMP 05/09/2016 (Within Weeks)   BMI 29.48 kg/m²    WN, WD, NAD  Lung: Nml resp effort  Psych: Normal mood   Full affect  Neuro: Moves all ext well  ______________________      HPI & A/P -   Problem 1  - Low back pain   HPI   - Conts to be 5-6/10 pain. Feels it is her mattress.  Also worth with physical labor  Assessment  - Improving  Plan   - Cont with the prescribed weight loss regimen    Problem 2  - Obesity  HPI   - Weight  Date      170.6 lbs  1/15/20      162.4 lbs  3/18/20      154.0 lbs  7/06/20      157.0 lbs   8/17/20      154.2 lbs   9/30/20      156.0 lbs 11/18/20      Total wt loss to date: 14.6 lbs    DEN: 1700    Counting calories 7d/week, 1200 marry/d (but approximates)    Eating sweets 1d/month    Walking 30 min/day, 7 d/wk    Taking 3 mg Saxenda daily. Appetite suppression 5-6/10. No side effects - no GI effects or palpitations    Assessment  - Slightly worse; likely due to error in measurements from approximations  Plan   -   Rules:  · Count every calorie every day  · Limit sweets to one day per month  · Eliminate all sugar sweetened beverages  · Limit restaurants (including fast food and food from a convenience store) to one time every two weeks    Requirements:  · Make sure protein intake is at least 60 grams per day (Consider using a protein shake - Nectar, Pure Protein, Premier, Boost Max, Ensure Max, BeneProtein and [employer] (which is lactose-free) are milk-based options; Nectar, Premier Protein Clear, IsoPure Protein Drink, and Protein 2 O are water-based options; Quest (or Cosco, which is cheaper and is ordered on [address] [facility] [address]) and the Oh [employer] 1 protein bars can also be used, but have less protein in them ); other drink options can be found in the protein powder report on the [address] [place] [address] website  · Make sure that fiber intake is at least 22 grams per day. Do this by either eating 12 tablespoons of the original, plain Fiber One cereal every day or 4 tablespoons of Benefiber every day.  Work up to this amount slowly by starting with only one-fourth of the target amount and adding another one-fourth every one or two weeks  · Take one multivitamin every day    Targets:  · Limit calorie intake to 1200 marry/day; (eliminate all errors in measurements)  · Walk 30 minutes daily  · Establish 16 hours of food-free periods each day - no period can be less than 1 hours, the periods need to be the same every day for days that are the same (for example, workdays would have one set of food free periods and weekends would have another set of days), the usual sleep time should be included (the \"usual\" time you go to bed until the \"usual\" time you get up)  · Avoid eating 2 hours within bedtime    Tips:  · Do not eat outside of the dining room or the kitchen  · Do not eat while watching TV, videos, working on the computer or using a smart phone  · Never eat food out of the bag or container (unless it is a single serving bag)    Medications:  · Saxenda 3 mg/daily    Follow up  Return to see me in 6 weeks      Joanna Bass MD   [id number] [place] [address] Endocrinology & Obesity  11/18/20

## 2021-01-22 ENCOUNTER — OFFICE VISIT (OUTPATIENT)
Dept: BARIATRICS/WEIGHT MGMT | Age: 56
End: 2021-01-22
Payer: COMMERCIAL

## 2021-01-22 VITALS
TEMPERATURE: 97.2 F | DIASTOLIC BLOOD PRESSURE: 83 MMHG | SYSTOLIC BLOOD PRESSURE: 136 MMHG | HEIGHT: 61 IN | HEART RATE: 85 BPM | BODY MASS INDEX: 29.23 KG/M2 | WEIGHT: 154.8 LBS

## 2021-01-22 DIAGNOSIS — E66.09 CLASS 1 OBESITY DUE TO EXCESS CALORIES WITHOUT SERIOUS COMORBIDITY WITH BODY MASS INDEX (BMI) OF 32.0 TO 32.9 IN ADULT: ICD-10-CM

## 2021-01-22 DIAGNOSIS — M54.41 CHRONIC BILATERAL LOW BACK PAIN WITH BILATERAL SCIATICA: Primary | ICD-10-CM

## 2021-01-22 DIAGNOSIS — G89.29 CHRONIC BILATERAL LOW BACK PAIN WITH BILATERAL SCIATICA: Primary | ICD-10-CM

## 2021-01-22 DIAGNOSIS — M54.42 CHRONIC BILATERAL LOW BACK PAIN WITH BILATERAL SCIATICA: Primary | ICD-10-CM

## 2021-01-22 PROCEDURE — G8484 FLU IMMUNIZE NO ADMIN: HCPCS | Performed by: INTERNAL MEDICINE

## 2021-01-22 PROCEDURE — 99213 OFFICE O/P EST LOW 20 MIN: CPT | Performed by: INTERNAL MEDICINE

## 2021-01-22 PROCEDURE — G8417 CALC BMI ABV UP PARAM F/U: HCPCS | Performed by: INTERNAL MEDICINE

## 2021-01-22 PROCEDURE — G8428 CUR MEDS NOT DOCUMENT: HCPCS | Performed by: INTERNAL MEDICINE

## 2021-01-22 PROCEDURE — 3017F COLORECTAL CA SCREEN DOC REV: CPT | Performed by: INTERNAL MEDICINE

## 2021-01-22 PROCEDURE — 1036F TOBACCO NON-USER: CPT | Performed by: INTERNAL MEDICINE

## 2021-01-22 PROCEDURE — 99211 OFF/OP EST MAY X REQ PHY/QHP: CPT

## 2021-01-22 NOTE — PROGRESS NOTES
CC -   Low back pain, Obesity    Background -   Last visit: 11/18/20  First visit: 01/15/20    Low back pain:  Present x 1yr, bilateral, associated with bilateral sciatica, constant, 7/10, worse with lifting heavy objects, NSAIDs do not help, excess wt may be contributing to it  Obesity: Present since 2016 BMI 32.22, Wt 170.6 lbs; HS grad wt: 105 lbs; Highest wt 180 lbs, Lowest wt 105 lbs; Pattern of wt gain: grad; Wt change past yr: fluctuates 10 lbs by home scales; Most wt lost: 10 lbs (Saxenda, eating right: Other diets: none  Taking Saxenda 3mg daily    Initial Diet:    Number of meals per day - 2    Number of snacks per day - 2    Meal volume - 12\" plate, no seconds    Fast food/convenience store - 1x/week    Restaurants (not fast food) - 1-2x/week   Sweets - 7d/week (Tunde cups - 12-16 miniature size/day 44 marry/piece)   Chips - 0d/week   Crackers/pretzels - 1d/week (large pretzel ethel)   Nuts - 0d/week   Peanut Butter - 7d/week (on bread)   Popcorn - 0d/week   Dried fruit - 0d/week   Whole fruit - 7d/week (one piece)   Breakfast cereal - 0d/week   Granola/Protein/Energy bar - 0d/week   Sugar sweetened beverages - 16-24 oz reg soda/week  ______________________    [facility] BEHAVIORAL [facility] -  Medical problems: obesity, pre-HTN, migraines, low back pain, depression, restless leg syndrome  Medications: Gabapentin, Prozac, Maxalt, vaginal estrogen, Saxenda, ibuprofen    ROS -  Gen: no fever  Pulm: no cough    PE -  Gen : /83 (Site: Left Upper Arm, Position: Sitting, Cuff Size: Medium Adult)   Pulse 85   Temp 97.2 °F (36.2 °C) (Temporal)   Ht 5' 1\" (1.549 m)   Wt 154 lb 12.8 oz (70.2 kg)   LMP 05/09/2016 (Within Weeks)   BMI 29.25 kg/m²    WN, WD, NAD  Lung: Nml resp effort  Psych: Normal mood   Full affect  Neuro: Moves all ext well  ______________________      HPI & A/P -   Problem 1  - Low back pain   HPI   - Conts to be 7/10 pain.  Working moving her daughter's things out of the house has worsened her pain  Assessment  - Improving  Plan   - Cont with the prescribed weight loss regimen    Problem 2  - Obesity  HPI   - Weight  Date      170.6 lbs  1/15/20      162.4 lbs  3/18/20      154.0 lbs  7/06/20      157.0 lbs   8/17/20      154.2 lbs   9/30/20      156.0 lbs 11/18/20      154.8 lbs 01/22/21      Total wt loss to date: 14.6 lbs    DEN: 1700    Counting calories 0d/week (efforts disrupted by moving her daughter's things out of the house and storage unit and the holidays)    Not eating sweets, but has been eating chips and other chip-like snacks    Taking 3 mg Saxenda daily. Appetite suppression 5/10. No side effects - no GI problems or palpitations    Assessment  - No change; resumed   Plan   -   Rules:  · Count every calorie every day  · Limit sweets to one day per month  · Eliminate all sugar sweetened beverages  · Limit restaurants (including fast food and food from a convenience store) to one time every two weeks    Requirements:  · Make sure protein intake is at least 60 grams per day (Consider using a protein shake - Nectar, Pure Protein, Premier, Boost Max, Ensure Max, BeneProtein and [employer] (which is lactose-free) are milk-based options; Nectar, Premier Protein Clear, IsoPure Protein Drink, and Protein 2 O are water-based options; Quest (or Cosco, which is cheaper and is ordered on [address] [facility] [address]) and the Oh Yeah 1 protein bars can also be used, but have less protein in them ); other drink options can be found in the protein powder report on the [address] [place] [address] website  · Make sure that fiber intake is at least 22 grams per day. Do this by either eating 12 tablespoons of the original, plain Fiber One cereal every day or 4 tablespoons of Benefiber every day.  Work up to this amount slowly by starting with only one-fourth of the target amount and adding another one-fourth every one or two weeks  · Take one multivitamin every day    Targets:  · Limit calorie intake to 1200 marry/day; (eliminate all errors in measurements)  · Walk 30 minutes daily  · Establish 16 hours of food-free periods each day - no period can be less than 1 hours, the periods need to be the same every day for days that are the same (for example, workdays would have one set of food free periods and weekends would have another set of days), the usual sleep time should be included (the \"usual\" time you go to bed until the \"usual\" time you get up)  · Avoid eating 2 hours within bedtime    Tips:  · Do not eat outside of the dining room or the kitchen  · Do not eat while watching TV, videos, working on the computer or using a smart phone  · Never eat food out of the bag or container (unless it is a single serving bag)    Medications:  · Saxenda 3 mg/daily    Follow up  Return to see me in 6 weeks      Lilli Norris MD   [facility] Endocrinology & Obesity  1/22/21

## 2021-03-05 ENCOUNTER — TELEMEDICINE (OUTPATIENT)
Dept: BARIATRICS/WEIGHT MGMT | Age: 56
End: 2021-03-05
Payer: COMMERCIAL

## 2021-03-05 DIAGNOSIS — M54.41 CHRONIC BILATERAL LOW BACK PAIN WITH BILATERAL SCIATICA: Primary | ICD-10-CM

## 2021-03-05 DIAGNOSIS — E66.09 CLASS 1 OBESITY DUE TO EXCESS CALORIES WITHOUT SERIOUS COMORBIDITY WITH BODY MASS INDEX (BMI) OF 32.0 TO 32.9 IN ADULT: ICD-10-CM

## 2021-03-05 DIAGNOSIS — M54.42 CHRONIC BILATERAL LOW BACK PAIN WITH BILATERAL SCIATICA: Primary | ICD-10-CM

## 2021-03-05 DIAGNOSIS — G89.29 CHRONIC BILATERAL LOW BACK PAIN WITH BILATERAL SCIATICA: Primary | ICD-10-CM

## 2021-03-05 PROCEDURE — 3017F COLORECTAL CA SCREEN DOC REV: CPT | Performed by: INTERNAL MEDICINE

## 2021-03-05 PROCEDURE — 99443 PR PHYS/QHP TELEPHONE EVALUATION 21-30 MIN: CPT | Performed by: INTERNAL MEDICINE

## 2021-03-05 PROCEDURE — G8428 CUR MEDS NOT DOCUMENT: HCPCS | Performed by: INTERNAL MEDICINE

## 2021-03-05 NOTE — PROGRESS NOTES
Tarah Humphreys is a 48 y.o. female evaluated via telephone on 3/5/2021. Consent:  She and/or health care decision maker is aware that that she may receive a bill for this telephone service, depending on her insurance coverage, and has provided verbal consent to proceed: Yes      Documentation:  I communicated with the patient and/or health care decision maker about back pain and obesity. Details of this discussion including any medical advice provided are as follows:    CC:  Low back pain, Obesity    Background -   Last visit: 1/22/21  First visit: 01/15/20    Low back pain:  Present x 1yr, bilateral, associated with bilateral sciatica, constant, 7/10, worse with lifting heavy objects, NSAIDs do not help, excess wt may be contributing to it  Obesity: Present since 2016 BMI 32.22, Wt 170.6 lbs; HS grad wt: 105 lbs; Highest wt 180 lbs, Lowest wt 105 lbs; Pattern of wt gain: grad; Wt change past yr: fluctuates 10 lbs by home scales;  Most wt lost: 10 lbs (Saxenda, eating right: Other diets: none  Taking Saxenda 3mg daily    Initial Diet:    Number of meals per day - 2    Number of snacks per day - 2    Meal volume - 12\" plate, no seconds    Fast food/convenience store - 1x/week    Restaurants (not fast food) - 1-2x/week   Sweets - 7d/week (Tunde cups - 12-16 miniature size/day 44 marry/piece)   Chips - 0d/week   Crackers/pretzels - 1d/week (large pretzel ethel)   Nuts - 0d/week   Peanut Butter - 7d/week (on bread)   Popcorn - 0d/week   Dried fruit - 0d/week   Whole fruit - 7d/week (one piece)   Breakfast cereal - 0d/week   Granola/Protein/Energy bar - 0d/week   Sugar sweetened beverages - 16-24 oz reg soda/week  ______________________    [facility] -  Medical problems: obesity, pre-HTN, migraines, low back pain, depression, restless leg syndrome  Medications: Gabapentin, Prozac, Maxalt, vaginal estrogen, Saxenda, ibuprofen    ROS -  Gen: no fever  Pulm: no cough    PE -  Gen : LMP 05/09/2016 (Within Weeks)    WN, WD, NAD Lung: Nml resp effort  Psych: Normal mood   Full affect  Neuro: Moves all ext well  ______________________      HPI & A/P -   Problem 1  - Low back pain   HPI   - Severity past week: 10/10 pain. Exacerbated by doing home repair on her basement (laying tile)  Assessment  - Worsened  Plan   - Cont with the prescribed weight loss regimen    Problem 2  - Obesity  HPI   - Weight  Date      170.6 lbs  1/15/20      162.4 lbs  3/18/20      154.0 lbs  7/06/20      157.0 lbs   8/17/20      154.2 lbs   9/30/20      156.0 lbs 11/18/20      154.8 lbs 01/22/21 03/05/21 165    lbs Home, which is 10 lbs heavier than ours             Total wt loss to date: 14.6 lbs    DEN: 1700    Counting calories 0d/week due to disruption from renovating her basement    Eating sweets and chips one day per week    Taking 3 mg Saxenda daily. Appetite suppression 0-7/10. No side effects - no GI problems or palpitations  She would like to continue it for now    Assessment  - No change; resumed   Plan   -   Rules:  · Count every calorie every day  · Limit sweets to one day per month  · Eliminate all sugar sweetened beverages  · Limit restaurants (including fast food and food from a convenience store) to one time every two weeks    Requirements:  · Make sure protein intake is at least 60 grams per day (Consider using a protein shake - Nectar, Pure Protein, Premier, Boost Max, Ensure Max, BeneProtein and [employer] (which is lactose-free) are milk-based options; Nectar, Premier Protein Clear, IsoPure Protein Drink, and Protein 2 O are water-based options; Quest (or [employer]co, which is cheaper and is ordered on [address] [facility] [address]) and the Oh Yeah 1 protein bars can also be used, but have less protein in them ); other drink options can be found in the protein powder report on the [address] [place] [address] website  · Make sure that fiber intake is at least 22 grams per day.  Do this by either eating 12 tablespoons of the original, plain Fiber One cereal every day or 4 tablespoons of Benefiber every day. Work up to this amount slowly by starting with only one-fourth of the target amount and adding another one-fourth every one or two weeks  · Take one multivitamin every day    Targets:  · Limit calorie intake to 1200 marry/day; (eliminate all errors in measurements)  · Walk 30 minutes daily  · Establish 16 hours of food-free periods each day - no period can be less than 1 hours, the periods need to be the same every day for days that are the same (for example, workdays would have one set of food free periods and weekends would have another set of days), the usual sleep time should be included (the \"usual\" time you go to bed until the \"usual\" time you get up)  · Avoid eating 2 hours within bedtime    Tips:  · Do not eat outside of the dining room or the kitchen  · Do not eat while watching TV, videos, working on the computer or using a smart phone  · Never eat food out of the bag or container (unless it is a single serving bag)    Medications:  · Saxenda 3 mg/daily    Follow up  Return to see me in 6 weeks    I affirm this is a Patient Initiated Episode with a Patient who has not had a related appointment within my department in the past 7 days or scheduled within the next 24 hours. Patient identification was verified at the start of the visit: Yes    Total Time: minutes: 21-30 minutes (25 min)    The visit was conducted pursuant to the emergency declaration under the [address] [place] [address], [address] [place] [address] authority and the [employer] General Act. Patient identification was verified, and a caregiver was present when appropriate. The patient was located in a state where the provider was credentialed to provide care.     Note: not billable if this call serves to triage the patient into an appointment for the relevant concern      Liliya Granger MD  Endocrinology/Obesity  3/5/21

## 2021-03-05 NOTE — PATIENT INSTRUCTIONS
Rules:  · Count every calorie every day  · Limit sweets to one day per month  · Eliminate all sugar sweetened beverages  · Limit restaurants (including fast food and food from a convenience store) to one time every two weeks    Requirements:  · Make sure protein intake is at least 60 grams per day (Consider using a protein shake - Nectar, Pure Protein, Premier, Boost Max, Ensure Max, BeneProtein and [employer] (which is lactose-free) are milk-based options; Nectar, Premier Protein Clear, IsoPure Protein Drink, and Protein 2 O are water-based options; Quest (or Cosco, which is cheaper and is ordered on [address] [facility] [address]) and the [employer] 1 protein bars can also be used, but have less protein in them ); other drink options can be found in the protein powder report on the [address] [place] [address] website  · Make sure that fiber intake is at least 22 grams per day. Do this by either eating 12 tablespoons of the original, plain Fiber One cereal every day or 4 tablespoons of Benefiber every day.  Work up to this amount slowly by starting with only one-fourth of the target amount and adding another one-fourth every one or two weeks  · Take one multivitamin every day    Targets:  · Limit calorie intake to 1200 marry/day; (eliminate all errors in measurements)  · Walk 30 minutes daily  · Establish 16 hours of food-free periods each day - no period can be less than 1 hours, the periods need to be the same every day for days that are the same (for example, workdays would have one set of food free periods and weekends would have another set of days), the usual sleep time should be included (the \"usual\" time you go to bed until the \"usual\" time you get up)  · Avoid eating 2 hours within bedtime    Tips:  · Do not eat outside of the dining room or the kitchen  · Do not eat while watching TV, videos, working on the computer or using a smart phone  · Never eat food out of the bag or container (unless it is a single serving bag)    Medications:  · Saxenda 3 mg/daily    Follow up  Return to see me in 6 weeks

## 2021-04-22 ENCOUNTER — OFFICE VISIT (OUTPATIENT)
Dept: BARIATRICS/WEIGHT MGMT | Age: 56
End: 2021-04-22
Payer: COMMERCIAL

## 2021-04-22 VITALS
SYSTOLIC BLOOD PRESSURE: 143 MMHG | BODY MASS INDEX: 31.57 KG/M2 | TEMPERATURE: 97.6 F | DIASTOLIC BLOOD PRESSURE: 88 MMHG | HEART RATE: 76 BPM | WEIGHT: 167.2 LBS | HEIGHT: 61 IN

## 2021-04-22 DIAGNOSIS — F32.A ANXIETY AND DEPRESSION: ICD-10-CM

## 2021-04-22 DIAGNOSIS — G89.29 CHRONIC BILATERAL LOW BACK PAIN WITH BILATERAL SCIATICA: Primary | ICD-10-CM

## 2021-04-22 DIAGNOSIS — E66.09 CLASS 1 OBESITY DUE TO EXCESS CALORIES WITHOUT SERIOUS COMORBIDITY WITH BODY MASS INDEX (BMI) OF 32.0 TO 32.9 IN ADULT: ICD-10-CM

## 2021-04-22 DIAGNOSIS — M54.42 CHRONIC BILATERAL LOW BACK PAIN WITH BILATERAL SCIATICA: Primary | ICD-10-CM

## 2021-04-22 DIAGNOSIS — F41.9 ANXIETY AND DEPRESSION: ICD-10-CM

## 2021-04-22 DIAGNOSIS — M54.41 CHRONIC BILATERAL LOW BACK PAIN WITH BILATERAL SCIATICA: Primary | ICD-10-CM

## 2021-04-22 PROCEDURE — G8417 CALC BMI ABV UP PARAM F/U: HCPCS | Performed by: INTERNAL MEDICINE

## 2021-04-22 PROCEDURE — 3017F COLORECTAL CA SCREEN DOC REV: CPT | Performed by: INTERNAL MEDICINE

## 2021-04-22 PROCEDURE — 99215 OFFICE O/P EST HI 40 MIN: CPT | Performed by: INTERNAL MEDICINE

## 2021-04-22 PROCEDURE — 1036F TOBACCO NON-USER: CPT | Performed by: INTERNAL MEDICINE

## 2021-04-22 PROCEDURE — G8428 CUR MEDS NOT DOCUMENT: HCPCS | Performed by: INTERNAL MEDICINE

## 2021-04-22 PROCEDURE — 99211 OFF/OP EST MAY X REQ PHY/QHP: CPT

## 2021-04-22 RX ORDER — BUPROPION HYDROCHLORIDE 150 MG/1
150 TABLET ORAL EVERY MORNING
Qty: 90 TABLET | Refills: 1 | Status: SHIPPED
Start: 2021-04-22 | End: 2021-07-15 | Stop reason: SDUPTHER

## 2021-04-22 NOTE — PROGRESS NOTES
(LAC-HYDRIN) 12 % lotion Apply topically daily. 500 g 1    gabapentin (NEURONTIN) 100 MG capsule Take 1 capsule by mouth 6 times daily 540 capsule 1     No current facility-administered medications for this visit. ROS -  Gen: no fever  Pulm: no cough    PE -  Gen : BP (!) 143/88 (Site: Left Upper Arm, Position: Sitting, Cuff Size: Medium Adult)   Pulse 76   Temp 97.6 °F (36.4 °C) (Temporal)   Ht 5' 1\" (1.549 m)   Wt 167 lb 3.2 oz (75.8 kg)   LMP 05/09/2016 (Within Weeks)   BMI 31.59 kg/m²      WN, WD, NAD  Lung: Nml resp effort  Psych: Normal mood   Full affect  Neuro: Moves all ext well  ______________________      HPI & A/P -   Problem 1  - Low back pain   HPI   - Severity past week: 10/10 pain. No change  Assessment  - Uncontrolled  Plan   - Cont with the prescribed weight loss regimen    Problem 2  - Obesity  HPI   - Weight  Date      170.6 lbs  1/15/20      162.4 lbs  3/18/20      154.0 lbs  7/06/20      157.0 lbs   8/17/20      154.2 lbs   9/30/20      156.0 lbs 11/18/20      154.8 lbs 01/22/21 03/05/21 165    lbs Home, which is 10 lbs heavier than ours       167.0 lbs 04/22/21            Total wt loss to date: 14.6 lbs    DEN: 1700    Counting calories 0d/week due to stress (34 yo step-daughter who struggles with addiction is now expecting)    Sweets daily + large amounts of pasta due to the stress    Taking 3 mg Saxenda daily. Appetite suppression 0/10. No side effects - no GI problems or palpitations. Insurance has notified her that it will no longer be covered    We discussed Contrave and Bupropion. She is agreeable with a trial of the latter. We discussed efficacy rates and side effects. This will also have the added benefit of treating her depression (mild in severity). Prozac is 40 mg daily.  Max is 60 mg, therefore I fill comfortable with her taking both  Assessment  - No change; resumed   Plan   -   Rules:  · Count every calorie every day  · Limit sweets to one day per month  · Eliminate all sugar sweetened beverages  · Limit restaurants (including fast food and food from a convenience store) to one time every two weeks    Requirements:  · Make sure protein intake is at least 60 grams per day (Consider using a protein shake - Nectar, Pure Protein, Premier, Boost Max, Ensure Max, BeneProtein and [employer] (which is lactose-free) are milk-based options; Nectar, Premier Protein Clear, IsoPure Protein Drink, and Protein 2 O are water-based options; Quest (or Cosco, which is cheaper and is ordered on [employer]) and the [employer] protein bars can also be used, but have less protein in them ); other drink options can be found in the protein powder report on the [address] [place] [address] website  · Make sure that fiber intake is at least 22 grams per day. Do this by either eating 12 tablespoons of the original, plain Fiber One cereal every day or 4 tablespoons of Benefiber every day.  Work up to this amount slowly by starting with only one-fourth of the target amount and adding another one-fourth every one or two weeks  · Take one multivitamin every day    Targets:  · Limit calorie intake to 1200 marry/day; (eliminate all errors in measurements)  · Walk 30 minutes daily  · Establish 16 hours of food-free periods each day - no period can be less than 1 hours, the periods need to be the same every day for days that are the same (for example, workdays would have one set of food free periods and weekends would have another set of days), the usual sleep time should be included (the \"usual\" time you go to bed until the \"usual\" time you get up)  · Avoid eating 2 hours within bedtime    Tips:  · Do not eat outside of the dining room or the kitchen  · Do not eat while watching TV, videos, working on the computer or using a smart phone  · Never eat food out of the bag or container (unless it is a single serving bag)    Medications:  · Complete current prescription of Saxenda 3 mg/daily  · Start Bupropion  mg daily Follow up  Return to see me in 6 weeks  Total time spent on the encounter: 40 min      Lelo Davila MD  Endocrinology/Obesity  4/22/21

## 2021-04-22 NOTE — PATIENT INSTRUCTIONS
Complete current prescription of Saxenda 3 mg/daily  · Start Bupropion  mg daily    Follow up  Return to see me in 6 weeks

## 2021-06-03 RX ORDER — LIRAGLUTIDE 6 MG/ML
INJECTION, SOLUTION SUBCUTANEOUS
Qty: 15 ML | Refills: 3 | Status: SHIPPED
Start: 2021-06-03 | End: 2021-07-15

## 2021-06-04 ENCOUNTER — OFFICE VISIT (OUTPATIENT)
Dept: BARIATRICS/WEIGHT MGMT | Age: 56
End: 2021-06-04
Payer: COMMERCIAL

## 2021-06-04 VITALS
WEIGHT: 165 LBS | DIASTOLIC BLOOD PRESSURE: 82 MMHG | BODY MASS INDEX: 31.15 KG/M2 | SYSTOLIC BLOOD PRESSURE: 133 MMHG | HEART RATE: 78 BPM | HEIGHT: 61 IN | TEMPERATURE: 97.6 F

## 2021-06-04 DIAGNOSIS — E66.9 OBESITY (BMI 30-39.9): ICD-10-CM

## 2021-06-04 DIAGNOSIS — M54.42 CHRONIC BILATERAL LOW BACK PAIN WITH BILATERAL SCIATICA: Primary | ICD-10-CM

## 2021-06-04 DIAGNOSIS — G89.29 CHRONIC BILATERAL LOW BACK PAIN WITH BILATERAL SCIATICA: Primary | ICD-10-CM

## 2021-06-04 DIAGNOSIS — M54.41 CHRONIC BILATERAL LOW BACK PAIN WITH BILATERAL SCIATICA: Primary | ICD-10-CM

## 2021-06-04 PROCEDURE — 99214 OFFICE O/P EST MOD 30 MIN: CPT | Performed by: INTERNAL MEDICINE

## 2021-06-04 PROCEDURE — 99211 OFF/OP EST MAY X REQ PHY/QHP: CPT

## 2021-06-04 PROCEDURE — 3017F COLORECTAL CA SCREEN DOC REV: CPT | Performed by: INTERNAL MEDICINE

## 2021-06-04 PROCEDURE — G8428 CUR MEDS NOT DOCUMENT: HCPCS | Performed by: INTERNAL MEDICINE

## 2021-06-04 PROCEDURE — 1036F TOBACCO NON-USER: CPT | Performed by: INTERNAL MEDICINE

## 2021-06-04 PROCEDURE — G8417 CALC BMI ABV UP PARAM F/U: HCPCS | Performed by: INTERNAL MEDICINE

## 2021-06-04 NOTE — PROGRESS NOTES
CC:  Low back pain, Obesity    Background -   Last visit: 04/22/21  First visit: 01/15/20    Low back pain:  Present x 1yr, bilateral, associated with bilateral sciatica, constant, 7/10, worse with lifting heavy objects, NSAIDs do not help, excess wt may be contributing to it  Obesity: Present since 2016 BMI 32.22, Wt 170.6 lbs; HS grad wt: 105 lbs; Highest wt 180 lbs, Lowest wt 105 lbs; Pattern of wt gain: grad; Wt change past yr: fluctuates 10 lbs by home scales;  Most wt lost: 10 lbs (Saxenda, eating right: Other diets: none    Initial Diet:    Number of meals per day - 2    Number of snacks per day - 2    Meal volume - 12\" plate, no seconds    Fast food/convenience store - 1x/week    Restaurants (not fast food) - 1-2x/week   Sweets - 7d/week (Tunde cups - 12-16 miniature size/day 44 marry/piece)   Chips - 0d/week   Crackers/pretzels - 1d/week (large pretzel ethel)   Nuts - 0d/week   Peanut Butter - 7d/week (on bread)   Popcorn - 0d/week   Dried fruit - 0d/week   Whole fruit - 7d/week (one piece)   Breakfast cereal - 0d/week   Granola/Protein/Energy bar - 0d/week   Sugar sweetened beverages - 16-24 oz reg soda/week  ______________________    [facility] -  Medical problems: obesity, pre-HTN, migraines, low back pain, depression, restless leg syndrome  Current Outpatient Medications   Medication Sig Dispense Refill    SAXENDA 18 MG/3ML SOPN inject 3 milligram subcutaneously once daily 15 mL 3    buPROPion (WELLBUTRIN XL) 150 MG extended release tablet Take 1 tablet by mouth every morning 90 tablet 1    FLUoxetine (PROZAC) 40 MG capsule TAKE 1 CAPSULE DAILY 90 capsule 0    rizatriptan (MAXALT-MLT) 10 MG disintegrating tablet place 1 tablet ON THE TONGUE UNTIL DISSOLVED  may repeat after 2 hours if needed *DO NOT EXCEED 3 TABLETS IN 1 DAY* 6 tablet 3    EPINEPHrine (EPIPEN 2-ARIAN) 0.3 MG/0.3ML SOAJ injection Use as directed 2 each 2    BD PEN NEEDLE DALE U/F 32G X 4 MM MISC use once daily 100 each 3    estradiol (ESTRACE VAGINAL) 0.1 MG/GM vaginal cream Insert 1g vaginally twice weekly. 1 Tube 2    ammonium lactate (LAC-HYDRIN) 12 % lotion Apply topically daily. 500 g 1    gabapentin (NEURONTIN) 100 MG capsule Take 1 capsule by mouth 6 times daily 540 capsule 1     No current facility-administered medications for this visit. ROS -  Gen: no fever  Pulm: no cough    PE -  Gen : /82 (Site: Left Upper Arm, Position: Sitting, Cuff Size: Large Adult)   Pulse 78   Temp 97.6 °F (36.4 °C) (Temporal)   Ht 5' 1\" (1.549 m)   Wt 165 lb (74.8 kg)   LMP 05/09/2016 (Within Weeks)   BMI 31.18 kg/m²    WN, WD, NAD  Lung: Nml resp effort  Psych: Normal mood   Full affect  Neuro: Moves all ext well  ______________________      HPI & A/P -   Problem 1  - Low back pain   HPI   - Severity past week: 5/10 pain. Improved (feels that stretching and doing a lot of walking on vacation has helped)  Assessment  - Uncontrolled, but improved   Plan   - Cont with the prescribed weight loss regimen    Problem 2  - Obesity  HPI   - Weight  Date      170.6 lbs  1/15/20      162.4 lbs  3/18/20      154.0 lbs  7/06/20      157.0 lbs   8/17/20      154.2 lbs   9/30/20      156.0 lbs 11/18/20      154.8 lbs 01/22/21 03/05/21 165    lbs Home, which is 10 lbs heavier than ours       167.0 lbs 04/22/21      165.0 lbs 06/04/21      Total wt loss to date: 5.6 lbs    DEN: 1700 marry/d    Counting calories 7day/wk    Sweets daily + large amounts of pasta due to the stress    Taking 3 mg Saxenda daily. Insurance has notified her that it was no longer being covered. Therefore, I we discussed at the 4/22/21 visit switching her to another agent. We discussed Contrave and Bupropion. She is agree with a trial of the latter. We discussed efficacy rates and side effects. This will also have the added benefit of treating her depression (mild in severity). Prozac is 40 mg daily.  Max is 60 mg, therefore I fill comfortable with her taking both    Update:    Taking Saxenda 3 mg daily and also Bupropion  mg daily. With the addition of the Bupropion, after-bedtime snacking stopped; no side effects; appetite suppression 10/10 before and after 5pm    Has been told by insurance that Bernardo Hearn will be approved with a prior authorization    Went on vacation to [place] for one week and had a get-together on [employer] and still did not gain weight    Counting calories every day (other than the one week of vacation)    Following her rules of intervention without exception except for restaurants on vacation  Assessment  - No change; resumed   Plan   -   Rules:  · Count every calorie every day  · Limit sweets to one day per month  · Eliminate all sugar sweetened beverages  · Limit restaurants (including fast food and food from a convenience store) to one time every two weeks    Requirements:  · Make sure protein intake is at least 60 grams per day (Consider using a protein shake - Nectar, Pure Protein, Premier, Boost Max, Ensure Max, BeneProtein and [employer] (which is lactose-free) are milk-based options; Nectar, Premier Protein Clear, IsoPure Protein Drink, and Protein 2 O are water-based options; Quest (or Cosco, which is cheaper and is ordered on [employer]) and the [employer] 1 protein bars can also be used, but have less protein in them ); other drink options can be found in the protein powder report on the [address] [place] [address] website  · Make sure that fiber intake is at least 22 grams per day. Do this by either eating 12 tablespoons of the original, plain Fiber One cereal every day or 4 tablespoons of Benefiber every day.  Work up to this amount slowly by starting with only one-fourth of the target amount and adding another one-fourth every one or two weeks  · Take one multivitamin every day    Targets:  · Limit calorie intake to 1200 marry/day; (eliminate all errors in measurements)  · Walk 30 minutes daily  · Establish 16 hours of food-free periods each day - no period can be less than 1 hours, the periods need to be the same every day for days that are the same (for example, workdays would have one set of food free periods and weekends would have another set of days), the usual sleep time should be included (the \"usual\" time you go to bed until the \"usual\" time you get up)  · Avoid eating 2 hours within bedtime    Tips:  · Do not eat outside of the dining room or the kitchen  · Do not eat while watching TV, videos, working on the computer or using a smart phone  · Never eat food out of the bag or container (unless it is a single serving bag)    Medications:  · Cont Saxenda 3 mg/daily  · Bupropion  mg daily    Follow up  Return to see me in 6 weeks    Sonia Coronado MD, MD  Endocrinology/Obesity  6/4/21

## 2021-06-04 NOTE — PATIENT INSTRUCTIONS
Rules:  · Count every calorie every day  · Limit sweets to one day per month  · Eliminate all sugar sweetened beverages  · Limit restaurants (including fast food and food from a convenience store) to one time every two weeks    Requirements:  · Make sure protein intake is at least 60 grams per day (Consider using a protein shake - Nectar, Pure Protein, Premier, Boost Max, Ensure Max, BeneProtein and [employer] (which is lactose-free) are milk-based options; Nectar, Premier Protein Clear, IsoPure Protein Drink, and Protein 2 O are water-based options; Quest (or Cosco, which is cheaper and is ordered on [employer]) and the [employer] 1 protein bars can also be used, but have less protein in them ); other drink options can be found in the protein powder report on the [address] [place] [address] website  · Make sure that fiber intake is at least 22 grams per day. Do this by either eating 12 tablespoons of the original, plain Fiber One cereal every day or 4 tablespoons of Benefiber every day.  Work up to this amount slowly by starting with only one-fourth of the target amount and adding another one-fourth every one or two weeks  · Take one multivitamin every day    Targets:  · Limit calorie intake to 1200 marry/day; (eliminate all errors in measurements)  · Walk 30 minutes daily  · Establish 16 hours of food-free periods each day - no period can be less than 1 hours, the periods need to be the same every day for days that are the same (for example, workdays would have one set of food free periods and weekends would have another set of days), the usual sleep time should be included (the \"usual\" time you go to bed until the \"usual\" time you get up)  · Avoid eating 2 hours within bedtime    Tips:  · Do not eat outside of the dining room or the kitchen  · Do not eat while watching TV, videos, working on the computer or using a smart phone  · Never eat food out of the bag or container (unless it is a single serving bag)    Medications:  · Cont Saxenda 3 mg/daily  · Bupropion  mg daily    Call me if there are problems:  [phone]    Follow up  Return to see me in 6 weeks

## 2021-07-15 ENCOUNTER — OFFICE VISIT (OUTPATIENT)
Dept: BARIATRICS/WEIGHT MGMT | Age: 56
End: 2021-07-15
Payer: COMMERCIAL

## 2021-07-15 VITALS
BODY MASS INDEX: 32.36 KG/M2 | DIASTOLIC BLOOD PRESSURE: 76 MMHG | SYSTOLIC BLOOD PRESSURE: 132 MMHG | HEIGHT: 61 IN | WEIGHT: 171.4 LBS | HEART RATE: 69 BPM | TEMPERATURE: 97.3 F

## 2021-07-15 DIAGNOSIS — G89.29 CHRONIC BILATERAL LOW BACK PAIN WITH BILATERAL SCIATICA: Primary | ICD-10-CM

## 2021-07-15 DIAGNOSIS — M54.41 CHRONIC BILATERAL LOW BACK PAIN WITH BILATERAL SCIATICA: Primary | ICD-10-CM

## 2021-07-15 DIAGNOSIS — E66.09 CLASS 1 OBESITY DUE TO EXCESS CALORIES WITHOUT SERIOUS COMORBIDITY WITH BODY MASS INDEX (BMI) OF 32.0 TO 32.9 IN ADULT: ICD-10-CM

## 2021-07-15 DIAGNOSIS — M54.42 CHRONIC BILATERAL LOW BACK PAIN WITH BILATERAL SCIATICA: Primary | ICD-10-CM

## 2021-07-15 DIAGNOSIS — F32.A ANXIETY AND DEPRESSION: ICD-10-CM

## 2021-07-15 DIAGNOSIS — F41.9 ANXIETY AND DEPRESSION: ICD-10-CM

## 2021-07-15 PROCEDURE — G8428 CUR MEDS NOT DOCUMENT: HCPCS | Performed by: INTERNAL MEDICINE

## 2021-07-15 PROCEDURE — 3017F COLORECTAL CA SCREEN DOC REV: CPT | Performed by: INTERNAL MEDICINE

## 2021-07-15 PROCEDURE — 99211 OFF/OP EST MAY X REQ PHY/QHP: CPT | Performed by: INTERNAL MEDICINE

## 2021-07-15 PROCEDURE — 99214 OFFICE O/P EST MOD 30 MIN: CPT | Performed by: INTERNAL MEDICINE

## 2021-07-15 PROCEDURE — 1036F TOBACCO NON-USER: CPT | Performed by: INTERNAL MEDICINE

## 2021-07-15 PROCEDURE — G8417 CALC BMI ABV UP PARAM F/U: HCPCS | Performed by: INTERNAL MEDICINE

## 2021-07-15 RX ORDER — BUPROPION HYDROCHLORIDE 150 MG/1
300 TABLET ORAL EVERY MORNING
Qty: 90 TABLET | Refills: 1
Start: 2021-07-15 | End: 2021-07-15 | Stop reason: SINTOL

## 2021-07-15 RX ORDER — TOPIRAMATE 25 MG/1
25 TABLET ORAL 2 TIMES DAILY
Qty: 60 TABLET | Refills: 1 | Status: SHIPPED
Start: 2021-07-15 | End: 2021-08-19 | Stop reason: SINTOL

## 2021-07-15 NOTE — PROGRESS NOTES
CC:  Low back pain, Obesity    Background -   Last visit: 06/04/21  First visit: 01/15/20    Low back pain:  Present x 1yr, bilateral, associated with bilateral sciatica, constant, 7/10, worse with lifting heavy objects, NSAIDs do not help, excess wt may be contributing to it  Obesity: Present since 2016 BMI 32.22, Wt 170.6 lbs; HS grad wt: 105 lbs; Highest wt 180 lbs, Lowest wt 105 lbs; Pattern of wt gain: grad; Wt change past yr: fluctuates 10 lbs by home scales;  Most wt lost: 10 lbs (Saxenda, eating right: Other diets: none    Initial Diet:    Number of meals per day - 2    Number of snacks per day - 2    Meal volume - 12\" plate, no seconds    Fast food/convenience store - 1x/week    Restaurants (not fast food) - 1-2x/week   Sweets - 7d/week (Tunde cups - 12-16 miniature size/day 44 marry/piece)   Chips - 0d/week   Crackers/pretzels - 1d/week (large pretzel ethel)   Nuts - 0d/week   Peanut Butter - 7d/week (on bread)   Popcorn - 0d/week   Dried fruit - 0d/week   Whole fruit - 7d/week (one piece)   Breakfast cereal - 0d/week   Granola/Protein/Energy bar - 0d/week   Sugar sweetened beverages - 16-24 oz reg soda/week  ______________________    [facility]  Medical problems: obesity, pre-HTN, migraines, low back pain, depression, restless leg syndrome  Current Outpatient Medications   Medication Sig Dispense Refill    SAXENDA 18 MG/3ML SOPN inject 3 milligram subcutaneously once daily 15 mL 3    buPROPion (WELLBUTRIN XL) 150 MG extended release tablet Take 1 tablet by mouth every morning 90 tablet 1    FLUoxetine (PROZAC) 40 MG capsule TAKE 1 CAPSULE DAILY 90 capsule 0    rizatriptan (MAXALT-MLT) 10 MG disintegrating tablet place 1 tablet ON THE TONGUE UNTIL DISSOLVED  may repeat after 2 hours if needed *DO NOT EXCEED 3 TABLETS IN 1 DAY* 6 tablet 3    EPINEPHrine (EPIPEN 2-ARIAN) 0.3 MG/0.3ML SOAJ injection Use as directed 2 each 2    BD PEN NEEDLE DALE U/F 32G X 4 MM MISC use once daily 100 each 3    estradiol (ESTRACE VAGINAL) 0.1 MG/GM vaginal cream Insert 1g vaginally twice weekly. 1 Tube 2    ammonium lactate (LAC-HYDRIN) 12 % lotion Apply topically daily. 500 g 1    gabapentin (NEURONTIN) 100 MG capsule Take 1 capsule by mouth 6 times daily 540 capsule 1     No current facility-administered medications for this visit. ROS -  Gen: no fever  Pulm: no cough    PE -  Gen : BP (!) 155/74 (Site: Left Upper Arm, Position: Sitting, Cuff Size: Large Adult)   Pulse 73   Temp 97.3 °F (36.3 °C) (Temporal)   Ht 5' 1\" (1.549 m)   Wt 171 lb 6.4 oz (77.7 kg)   LMP 05/09/2016 (Within Weeks)   BMI 32.39 kg/m²    Repeat /76   WN, WD, NAD  Lung: Nml resp effort  Psych: Normal mood   Full affect  Neuro: Moves all ext well  ______________________      HPI & A/P -   Problem 1  - Low back pain   HPI   - Severity past week: 7/10 pain. (worsened due to increased activity)  Assessment  - Uncontrolled, but improved   Plan   - Cont with the prescribed weight loss regimen    Problem 2  - Obesity  HPI   - Weight  Date      170.6 lbs  1/15/20      162.4 lbs  3/18/20      154.0 lbs  7/06/20      157.0 lbs   8/17/20      154.2 lbs   9/30/20      156.0 lbs 11/18/20      154.8 lbs 01/22/21 03/05/21 165    lbs Home, which is 10 lbs heavier than ours       167.0 lbs 04/22/21      165.0 lbs 06/04/21 Elizabeth Brown stopped due to loss of insurance coverge      171.4 lbs 07/15/21      Total wt loss to date: +1.2 lbs    DEN: 1700 marry/d   Attempt at prior auth for Elizabeth Brown failed. Has taken none since last encounter   Counting calories 7day/wk limiting calories to 9393-6809 marry/d   Not eating sweets   Eating restaurant food 1x/day  Taking Bupropion  mg daily; appetite suppression 0/10, side effects - high bp at home  Assessment  - worsened; wt gain occurred after stopping Saxenda;  Stop bupropion b/c of elevated bp and switch to topiramate; keep the plan o/w the same (has been successful in the past)  Plan   -   Rules:  · Count every calorie every day  · Limit sweets to one day per month  · Eliminate all sugar sweetened beverages  · Limit restaurants (including fast food and food from a convenience store) to one time every two weeks    Requirements:  · Make sure protein intake is at least 60 grams per day (Consider using a protein shake - Nectar, Pure Protein, Premier, Boost Max, Ensure Max, BeneProtein and [employer] (which is lactose-free) are milk-based options; Nectar, Premier Protein Clear, IsoPure Protein Drink, and Protein 2 O are water-based options; Quest (or Cosco, which is cheaper and is ordered on [address] [facility] [address]) and the [employer] 1 protein bars can also be used, but have less protein in them ); other drink options can be found in the protein powder report on the [address] [place] [address] website  · Make sure that fiber intake is at least 22 grams per day. Do this by either eating 12 tablespoons of the original, plain Fiber One cereal every day or 4 tablespoons of Benefiber every day.  Work up to this amount slowly by starting with only one-fourth of the target amount and adding another one-fourth every one or two weeks  · Take one multivitamin every day    Targets:  · Limit calorie intake to 1200 marry/day; (eliminate all errors in measurements)  · Walk 30 minutes daily  · Establish 16 hours of food-free periods each day - no period can be less than 1 hours, the periods need to be the same every day for days that are the same (for example, workdays would have one set of food free periods and weekends would have another set of days), the usual sleep time should be included (the \"usual\" time you go to bed until the \"usual\" time you get up)  · Avoid eating 2 hours within bedtime    Tips:  · Do not eat outside of the dining room or the kitchen  · Do not eat while watching TV, videos, working on the computer or using a smart phone  · Never eat food out of the bag or container (unless it is a single serving bag)    Medications:  · Stop Bupropion  mg  · Start topiramate 25 mg twice daily    Follow up  Return to see me in 4-6 weeks    Juan J Lugo MD  Endocrinology/Obesity  7/15/21

## 2021-07-26 ENCOUNTER — TELEPHONE (OUTPATIENT)
Dept: ENDOCRINOLOGY | Age: 56
End: 2021-07-26

## 2021-07-26 NOTE — TELEPHONE ENCOUNTER
Received message from pt that topiramate has caused side effects of mood and difficulty waking up in the mornings  I LVM instructing her to stop taking it.  No other low cost options are available for an appetite suppressant  SDR  07/26/21

## 2021-08-19 ENCOUNTER — OFFICE VISIT (OUTPATIENT)
Dept: BARIATRICS/WEIGHT MGMT | Age: 56
End: 2021-08-19
Payer: COMMERCIAL

## 2021-08-19 VITALS
BODY MASS INDEX: 32.17 KG/M2 | TEMPERATURE: 97.1 F | HEIGHT: 61 IN | DIASTOLIC BLOOD PRESSURE: 80 MMHG | HEART RATE: 79 BPM | SYSTOLIC BLOOD PRESSURE: 138 MMHG | WEIGHT: 170.4 LBS

## 2021-08-19 DIAGNOSIS — E66.09 CLASS 1 OBESITY DUE TO EXCESS CALORIES WITHOUT SERIOUS COMORBIDITY WITH BODY MASS INDEX (BMI) OF 32.0 TO 32.9 IN ADULT: ICD-10-CM

## 2021-08-19 DIAGNOSIS — M54.41 CHRONIC BILATERAL LOW BACK PAIN WITH BILATERAL SCIATICA: Primary | ICD-10-CM

## 2021-08-19 DIAGNOSIS — M54.42 CHRONIC BILATERAL LOW BACK PAIN WITH BILATERAL SCIATICA: Primary | ICD-10-CM

## 2021-08-19 DIAGNOSIS — G89.29 CHRONIC BILATERAL LOW BACK PAIN WITH BILATERAL SCIATICA: Primary | ICD-10-CM

## 2021-08-19 PROCEDURE — 99214 OFFICE O/P EST MOD 30 MIN: CPT | Performed by: INTERNAL MEDICINE

## 2021-08-19 PROCEDURE — 1036F TOBACCO NON-USER: CPT | Performed by: INTERNAL MEDICINE

## 2021-08-19 PROCEDURE — 3017F COLORECTAL CA SCREEN DOC REV: CPT | Performed by: INTERNAL MEDICINE

## 2021-08-19 PROCEDURE — G8417 CALC BMI ABV UP PARAM F/U: HCPCS | Performed by: INTERNAL MEDICINE

## 2021-08-19 PROCEDURE — G8428 CUR MEDS NOT DOCUMENT: HCPCS | Performed by: INTERNAL MEDICINE

## 2021-08-19 PROCEDURE — 99211 OFF/OP EST MAY X REQ PHY/QHP: CPT

## 2021-08-19 RX ORDER — PHENTERMINE HYDROCHLORIDE 37.5 MG/1
37.5 TABLET ORAL
Qty: 30 TABLET | Refills: 0 | Status: SHIPPED | OUTPATIENT
Start: 2021-08-19 | End: 2021-09-18

## 2021-08-19 NOTE — PATIENT INSTRUCTIONS
30-90 min before effect will be needed. While taking phentermine, check the Blood Pressure every morning and every evening. If the systolic BP is >523 mmHg, the diastolic BP is >19 mm/Hg or the heart rate is > 100 beats per minute, do not take phentermine that day. If the systolic BP is consistently >155 mmHg, the diastolic BP is consistently above 90 mm/Hg or the heart rate is consistently > 100 beats per minute, then stop taking phentermine altogether. If the systolic BP >908 mmHg or the diastolic BP is >722 mmHg (even if it is only once), then phentermine should be stopped altogether without proving that any of these are consistently elevated.       Follow up  Return to see me in one month

## 2021-08-19 NOTE — PROGRESS NOTES
suppressant. Will therefore start phentermine  Assessment  - slight improvement  Plan   -   Rules:  · Count every calorie every day  · Limit sweets to one day per month  · Eliminate all sugar sweetened beverages  · Limit restaurants (including fast food and food from a convenience store) to one time every two weeks    Requirements:  · Make sure protein intake is at least 60 grams per day (Consider using a protein shake - Nectar, Pure Protein, Premier, Boost Max, Ensure Max, BeneProtein and [employer] (which is lactose-free) are milk-based options; Nectar, Premier Protein Clear, IsoPure Protein Drink, and Protein 2 O are water-based options; Quest (or Cosco, which is cheaper and is ordered on [address] [facility] [address]) and the Oh Comply7 1 protein bars can also be used, but have less protein in them )  · Make sure that fiber intake is at least 22 grams per day. Do this by either eating 12 tablespoons of the original, plain Fiber One cereal every day or 4 tablespoons of Benefiber every day. Work up to this amount slowly by starting with only one-fourth of the target amount and adding another one-fourth every one or two weeks  · Take one multivitamin every day    Targets:  · Limit calorie intake to 1200 marry/day; (eliminate all errors in measurements)  · Walk 30 minutes daily  · Establish 16 hours of food-free periods each day - no period can be less than 1 hours, the periods need to be the same every day for days that are the same (for example, workdays would have one set of food free periods and weekends would have another set of days), the usual sleep time should be included (the \"usual\" time you go to bed until the \"usual\" time you get up)  · Avoid eating 2 hours within bedtime    Tips:  · Do not eat outside of the dining room or the kitchen  · Do not eat while watching TV, videos, working on the computer or using a smart phone  · Never eat food out of the bag or container (unless it is a single serving bag)    Medications:   Take phentermine 37.5 mg, one-half to one tablet daily as needed for appetite suppression. Take each dose 30-90 min before effect will be needed. While taking phentermine, check the Blood Pressure every morning and every evening. If the systolic BP is >263 mmHg, the diastolic BP is >04 mm/Hg or the heart rate is > 100 beats per minute, do not take phentermine that day. If the systolic BP is consistently >155 mmHg, the diastolic BP is consistently above 90 mm/Hg or the heart rate is consistently > 100 beats per minute, then stop taking phentermine altogether. If the systolic BP >159 mmHg or the diastolic BP is >589 mmHg (even if it is only once), then phentermine should be stopped altogether without proving that any of these are consistently elevated.       Follow up  Return to see me in one month    Mercedes Rodríguez MD  Endocrinology/Obesity  8/19/21

## 2021-09-30 ENCOUNTER — OFFICE VISIT (OUTPATIENT)
Dept: BARIATRICS/WEIGHT MGMT | Age: 56
End: 2021-09-30
Payer: COMMERCIAL

## 2021-09-30 VITALS
TEMPERATURE: 97.3 F | DIASTOLIC BLOOD PRESSURE: 83 MMHG | BODY MASS INDEX: 31 KG/M2 | HEART RATE: 80 BPM | SYSTOLIC BLOOD PRESSURE: 144 MMHG | HEIGHT: 61 IN | WEIGHT: 164.2 LBS

## 2021-09-30 DIAGNOSIS — E66.09 CLASS 1 OBESITY DUE TO EXCESS CALORIES WITHOUT SERIOUS COMORBIDITY WITH BODY MASS INDEX (BMI) OF 32.0 TO 32.9 IN ADULT: Primary | ICD-10-CM

## 2021-09-30 PROCEDURE — 99211 OFF/OP EST MAY X REQ PHY/QHP: CPT

## 2021-09-30 PROCEDURE — G8428 CUR MEDS NOT DOCUMENT: HCPCS | Performed by: INTERNAL MEDICINE

## 2021-09-30 PROCEDURE — 1036F TOBACCO NON-USER: CPT | Performed by: INTERNAL MEDICINE

## 2021-09-30 PROCEDURE — 99214 OFFICE O/P EST MOD 30 MIN: CPT | Performed by: INTERNAL MEDICINE

## 2021-09-30 PROCEDURE — 3017F COLORECTAL CA SCREEN DOC REV: CPT | Performed by: INTERNAL MEDICINE

## 2021-09-30 PROCEDURE — G8417 CALC BMI ABV UP PARAM F/U: HCPCS | Performed by: INTERNAL MEDICINE

## 2021-09-30 RX ORDER — PHENTERMINE HYDROCHLORIDE 37.5 MG/1
TABLET ORAL
Qty: 30 TABLET | Refills: 0 | Status: SHIPPED | OUTPATIENT
Start: 2021-09-30 | End: 2021-11-04 | Stop reason: SDUPTHER

## 2021-09-30 NOTE — PATIENT INSTRUCTIONS
Rules:  · Count every calorie every day  · Limit sweets to one day per month  · Eliminate all sugar sweetened beverages  · Limit restaurants (including fast food and food from a convenience store) to one time every two weeks    Requirements:  · Make sure protein intake is at least 60 grams per day (Consider using a protein shake - Nectar, Pure Protein, Premier, Boost Max, Ensure Max, BeneProtein and [employer] (which is lactose-free) are milk-based options; Nectar, Premier Protein Clear, IsoPure Protein Drink, and Protein 2 O are water-based options; Quest (or Cosco, which is cheaper and is ordered on [address] [facility] [address]) and the Oh CONEXANCE MD 1 protein bars can also be used, but have less protein in them )  · Make sure that fiber intake is at least 22 grams per day. Do this by either eating 12 tablespoons of the original, plain Fiber One cereal every day or 4 tablespoons of Benefiber every day. Work up to this amount slowly by starting with only one-fourth of the target amount and adding another one-fourth every one or two weeks  · Take one multivitamin every day    Targets:  · Limit calorie intake to 1600 marry/day  · Walk 30 minutes daily  · Establish 16 hours of food-free periods each day - no period can be less than 1 hours, the periods need to be the same every day for days that are the same (for example, workdays would have one set of food free periods and weekends would have another set of days), the usual sleep time should be included (the \"usual\" time you go to bed until the \"usual\" time you get up)  · Avoid eating 2 hours within bedtime    Tips:  · Do not eat outside of the dining room or the kitchen  · Do not eat while watching TV, videos, working on the computer or using a smart phone  · Never eat food out of the bag or container (unless it is a single serving bag)    Medications: Take phentermine 37.5 mg, one-half tablet twice daily as needed for appetite suppression.  Take each dose 30-90 min before effect will be needed. While taking phentermine, check the Blood Pressure every morning and every evening. If the systolic BP is >792 mmHg, the diastolic BP is >82 mm/Hg or the heart rate is > 100 beats per minute, do not take phentermine that day. If the systolic BP is consistently >155 mmHg, the diastolic BP is consistently above 90 mm/Hg or the heart rate is consistently > 100 beats per minute, then stop taking phentermine altogether. If the systolic BP >955 mmHg or the diastolic BP is >751 mmHg (even if it is only once), then phentermine should be stopped altogether without proving that any of these are consistently elevated.       Follow up  Return to see me in one month

## 2021-09-30 NOTE — PROGRESS NOTES
% lotion Apply topically daily. 500 g 1    gabapentin (NEURONTIN) 100 MG capsule Take 1 capsule by mouth 6 times daily 540 capsule 1     No current facility-administered medications for this visit. ROS -  Gen: no fever  Pulm: no cough    PE -  Gen : BP (!) 144/83 (Site: Left Upper Arm, Position: Sitting, Cuff Size: Medium Adult)   Pulse 80   Temp 97.3 °F (36.3 °C) (Temporal)   Ht 5' 1\" (1.549 m)   Wt 164 lb 3.2 oz (74.5 kg)   LMP 05/09/2016 (Within Weeks)   BMI 31.03 kg/m²    WN, WD, NAD  Heart:  RRR without mgr, 2+ LE pitting edema b/l  Lung: Nml resp effort, CTA b/l  Psych: Normal mood   Full affect  Neuro: Moves all ext well  ______________________      HPI & A/P -   Problem 1  - Low back pain   HPI   - Severity past week: 7/10 pain. (due to folding clothes at [facility] - bending and lifting)  Assessment  - Uncontrolled, but improved   Plan   - Cont with the prescribed weight loss regimen    Problem 2  - Obesity  HPI   - Weight  Date      170.6 lbs  1/15/20      162.4 lbs  3/18/20      154.0 lbs  7/06/20      157.0 lbs   8/17/20      154.2 lbs   9/30/20      156.0 lbs 11/18/20      154.8 lbs 01/22/21 03/05/21 165 lbs Home, which is 10 lbs heavier than ours       167.0 lbs 04/22/21      165.0 lbs 06/04/21 [place] stopped due to loss of insurance coverage; started bupropion      171.4 lbs 07/15/21 Bupropion stopped b/c BP; topiramate started      170.4 lbs 08/19/21 Stopped Topiramate due to mood and hair loss      164.2 lbs  09/30/21      Total wt loss to date: -6.4 lbs    DEN: 1700 marry/d  B/C of error in counting (ie only losing 1 lbs in one month on a 1200 marry/d diet), she needs an appetite suppressant. Did not tolerate bupropion and topiramate.  Lost insurance coverage for Saxenda  Therefore phentermine started 8/19/21  Taking phentermine 37.5 mg, one-half tablet twice daily, appetite suppression - 10/10; side effects - dry mouth  Counting calories 6d/wk, limiting to 1600 marry/day  Assessment  - Improved; great response to phentermine  Plan   -   Rules:  · Count every calorie every day  · Limit sweets to one day per month  · Eliminate all sugar sweetened beverages  · Limit restaurants (including fast food and food from a convenience store) to one time every two weeks    Requirements:  · Make sure protein intake is at least 60 grams per day (Consider using a protein shake - Nectar, Pure Protein, Premier, Boost Max, Ensure Max, BeneProtein and [employer] (which is lactose-free) are milk-based options; Nectar, Premier Protein Clear, IsoPure Protein Drink, and Protein 2 O are water-based options; Quest (or Cosco, which is cheaper and is ordered on [address] [facility] [address]) and the Oh [employer] 1 protein bars can also be used, but have less protein in them )  · Make sure that fiber intake is at least 22 grams per day. Do this by either eating 12 tablespoons of the original, plain Fiber One cereal every day or 4 tablespoons of Benefiber every day. Work up to this amount slowly by starting with only one-fourth of the target amount and adding another one-fourth every one or two weeks  · Take one multivitamin every day    Targets:  · Limit calorie intake to 1600 marry/day  · Walk 30 minutes daily  · Establish 16 hours of food-free periods each day - no period can be less than 1 hours, the periods need to be the same every day for days that are the same (for example, workdays would have one set of food free periods and weekends would have another set of days), the usual sleep time should be included (the \"usual\" time you go to bed until the \"usual\" time you get up)  · Avoid eating 2 hours within bedtime    Tips:  · Do not eat outside of the dining room or the kitchen  · Do not eat while watching TV, videos, working on the computer or using a smart phone  · Never eat food out of the bag or container (unless it is a single serving bag)    Medications: Take phentermine 37.5 mg, one-half tablet twice daily as needed for appetite suppression.  Take each dose 30-90 min before effect will be needed. While taking phentermine, check the Blood Pressure every morning and every evening. If the systolic BP is >649 mmHg, the diastolic BP is >87 mm/Hg or the heart rate is > 100 beats per minute, do not take phentermine that day. If the systolic BP is consistently >155 mmHg, the diastolic BP is consistently above 90 mm/Hg or the heart rate is consistently > 100 beats per minute, then stop taking phentermine altogether. If the systolic BP >395 mmHg or the diastolic BP is >102 mmHg (even if it is only once), then phentermine should be stopped altogether without proving that any of these are consistently elevated.       Follow up  Return to see me in one month    Ted Ridley MD  Endocrinology/Obesity  9/30/21

## 2021-11-04 ENCOUNTER — OFFICE VISIT (OUTPATIENT)
Dept: BARIATRICS/WEIGHT MGMT | Age: 56
End: 2021-11-04
Payer: COMMERCIAL

## 2021-11-04 VITALS
TEMPERATURE: 96.8 F | WEIGHT: 161.4 LBS | HEART RATE: 82 BPM | SYSTOLIC BLOOD PRESSURE: 138 MMHG | DIASTOLIC BLOOD PRESSURE: 73 MMHG | HEIGHT: 61 IN | BODY MASS INDEX: 30.47 KG/M2

## 2021-11-04 DIAGNOSIS — F32.A ANXIETY AND DEPRESSION: ICD-10-CM

## 2021-11-04 DIAGNOSIS — E66.09 CLASS 1 OBESITY DUE TO EXCESS CALORIES WITHOUT SERIOUS COMORBIDITY WITH BODY MASS INDEX (BMI) OF 32.0 TO 32.9 IN ADULT: Primary | ICD-10-CM

## 2021-11-04 DIAGNOSIS — F41.9 ANXIETY AND DEPRESSION: ICD-10-CM

## 2021-11-04 PROCEDURE — G8428 CUR MEDS NOT DOCUMENT: HCPCS | Performed by: INTERNAL MEDICINE

## 2021-11-04 PROCEDURE — 99214 OFFICE O/P EST MOD 30 MIN: CPT | Performed by: INTERNAL MEDICINE

## 2021-11-04 PROCEDURE — 1036F TOBACCO NON-USER: CPT | Performed by: INTERNAL MEDICINE

## 2021-11-04 PROCEDURE — 99211 OFF/OP EST MAY X REQ PHY/QHP: CPT

## 2021-11-04 PROCEDURE — G8417 CALC BMI ABV UP PARAM F/U: HCPCS | Performed by: INTERNAL MEDICINE

## 2021-11-04 PROCEDURE — G8484 FLU IMMUNIZE NO ADMIN: HCPCS | Performed by: INTERNAL MEDICINE

## 2021-11-04 PROCEDURE — 3017F COLORECTAL CA SCREEN DOC REV: CPT | Performed by: INTERNAL MEDICINE

## 2021-11-04 RX ORDER — PHENTERMINE HYDROCHLORIDE 37.5 MG/1
TABLET ORAL
Qty: 30 TABLET | Refills: 0 | Status: SHIPPED | OUTPATIENT
Start: 2021-11-04 | End: 2021-12-04

## 2021-11-04 RX ORDER — VENLAFAXINE HYDROCHLORIDE 75 MG/1
75 CAPSULE, EXTENDED RELEASE ORAL DAILY
Qty: 30 CAPSULE | Refills: 2 | Status: SHIPPED
Start: 2021-11-04 | End: 2022-01-19 | Stop reason: SDUPTHER

## 2021-11-04 NOTE — PROGRESS NOTES
CC:  Low back pain, Obesity    Background -   Last visit: 09/30/21  First visit: 01/15/20    Low back pain:  Present x 1yr, bilateral, associated with bilateral sciatica, constant, 7/10, worse with lifting heavy objects, NSAIDs do not help, excess wt may be contributing to it  Obesity: Present since 2016 BMI 32.22, Wt 170.6 lbs; HS grad wt: 105 lbs; Highest wt 180 lbs, Lowest wt 105 lbs; Pattern of wt gain: grad; Wt change past yr: fluctuates 10 lbs by home scales; Most wt lost: 10 lbs (Saxenda, eating right: Other diets: none    Initial Diet:    Number of meals per day - 2    Number of snacks per day - 2    Meal volume - 12\" plate, no seconds    Fast food/convenience store - 1x/week    Restaurants (not fast food) - 1-2x/week   Sweets - 7d/week (Tunde cups - 12-16 miniature size/day 44 marry/piece)   Chips - 0d/week   Crackers/pretzels - 1d/week (large pretzel ethel)   Nuts - 0d/week   Peanut Butter - 7d/week (on bread)   Popcorn - 0d/week   Dried fruit - 0d/week   Whole fruit - 7d/week (one piece)   Breakfast cereal - 0d/week   Granola/Protein/Energy bar - 0d/week   Sugar sweetened beverages - 16-24 oz reg soda/week  ______________________    [facility] -     Past Medical History:   Diagnosis Date    Anxiety     Depression     Headache     Hypertension     Low back pain     Obesity     RLS (restless legs syndrome)        Current Outpatient Medications   Medication Sig Dispense Refill    FLUoxetine (PROZAC) 40 MG capsule TAKE 1 CAPSULE DAILY 90 capsule 0    rizatriptan (MAXALT-MLT) 10 MG disintegrating tablet place 1 tablet ON THE TONGUE UNTIL DISSOLVED  may repeat after 2 hours if needed *DO NOT EXCEED 3 TABLETS IN 1 DAY* 6 tablet 3    EPINEPHrine (EPIPEN 2-ARIAN) 0.3 MG/0.3ML SOAJ injection Use as directed 2 each 2    estradiol (ESTRACE VAGINAL) 0.1 MG/GM vaginal cream Insert 1g vaginally twice weekly.  1 Tube 2    gabapentin (NEURONTIN) 100 MG capsule Take 1 capsule by mouth 6 times daily 540 capsule 1     No current facility-administered medications for this visit. ROS -  Gen: no fever  Pulm: no cough    PE -  Gen : /73 (Site: Left Upper Arm, Position: Sitting, Cuff Size: Large Adult)   Pulse 82   Temp 96.8 °F (36 °C) (Temporal)   Ht 5' 1\" (1.549 m)   Wt 161 lb 6.4 oz (73.2 kg)   LMP 05/09/2016 (Within Weeks)   BMI 30.50 kg/m²    WN, WD, NAD  Heart:  RRR without mgr, 2+ LE pitting edema b/l  Lung: Nml resp effort, CTA b/l  Psych: Normal mood   Full affect  Neuro: Moves all ext well  ______________________      HPI & A/P -   Problem 1  - Low back pain   HPI   - Severity past week: 0/10 pain; she attributes the improvement to strengthening from her work at [address] [place] [address] with the prescribed weight loss regimen    Problem 2  - Obesity  HPI   - Weight  Date      170.6 lbs  1/15/20      162.4 lbs  3/18/20      154.0 lbs  7/06/20      157.0 lbs   8/17/20      154.2 lbs   9/30/20      156.0 lbs 11/18/20      154.8 lbs 01/22/21 03/05/21 165 lbs Home, which is 10 lbs heavier than ours       167.0 lbs 04/22/21      165.0 lbs 06/04/21 [place] stopped due to loss of insurance coverage; started bupropion      171.4 lbs 07/15/21 Bupropion stopped b/c BP; topiramate started      170.4 lbs 08/19/21 Stopped Topiramate due to mood and hair loss; Phentermine started      164.2 lbs  09/30/21 Phentermine      161.4 lbs 11/04/21         Total wt loss to date: -9.2 lbs    DEN: 1700 marry/d  B/C of error in counting (ie only losing 1 lbs in one month on a 1200 marry/d diet), she needs an appetite suppressant. Did not tolerate bupropion and topiramate.  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 Take each dose 30-90 min before effect will be needed. While taking phentermine, check the Blood Pressure every morning and every evening. If the systolic BP is >725 mmHg, the diastolic BP is >51 mm/Hg or the heart rate is > 100 beats per minute, do not take phentermine that day. If the systolic BP is consistently >155 mmHg, the diastolic BP is consistently above 90 mm/Hg or the heart rate is consistently > 100 beats per minute, then stop taking phentermine altogether. If the systolic BP >170 mmHg or the diastolic BP is >308 mmHg (even if it is only once), then phentermine should be stopped altogether without proving that any of these are consistently elevated.     · Take Venlafaxine 75 mg once daily      Follow up  Return to see me in 4-6 weeks    Marney Hammans, MD  Endocrinology/Obesity  11/4/21

## 2021-11-04 NOTE — PATIENT INSTRUCTIONS
Rules:  · Count every calorie every day  · Limit sweets to one day per month  · Eliminate all sugar sweetened beverages  · Limit restaurants (including fast food and food from a convenience store) to one time every two weeks    Requirements:  · Make sure protein intake is at least 60 grams per day (Consider using a protein shake - Nectar, Pure Protein, Premier, Boost Max, Ensure Max, BeneProtein and [employer] (which is lactose-free) are milk-based options; Nectar, Premier Protein Clear, IsoPure Protein Drink, and Protein 2 O are water-based options; Quest (or Cosco, which is cheaper and is ordered on [employer]) and the [employer] 1 protein bars can also be used, but have less protein in them )  · Make sure that fiber intake is at least 22 grams per day. Do this by either eating 12 tablespoons of the original, plain Fiber One cereal every day or 4 tablespoons of Benefiber every day. Work up to this amount slowly by starting with only one-fourth of the target amount and adding another one-fourth every one or two weeks  · Take one multivitamin every day    Targets:  · Limit calorie intake to 1600 marry/day  · Walk 30 minutes daily  · Establish 16 hours of food-free periods each day - no period can be less than 1 hours, the periods need to be the same every day for days that are the same (for example, workdays would have one set of food free periods and weekends would have another set of days), the usual sleep time should be included (the \"usual\" time you go to bed until the \"usual\" time you get up)  · Avoid eating 2 hours within bedtime    Tips:  · Do not eat outside of the dining room or the kitchen  · Do not eat while watching TV, videos, working on the computer or using a smart phone  · Never eat food out of the bag or container (unless it is a single serving bag)    Medications: Take phentermine 37.5 mg, one-half tablet twice daily as needed for appetite suppression.  Take each dose 30-90 min before effect will be needed. While taking phentermine, check the Blood Pressure every morning and every evening. If the systolic BP is >204 mmHg, the diastolic BP is >83 mm/Hg or the heart rate is > 100 beats per minute, do not take phentermine that day. If the systolic BP is consistently >155 mmHg, the diastolic BP is consistently above 90 mm/Hg or the heart rate is consistently > 100 beats per minute, then stop taking phentermine altogether. If the systolic BP >382 mmHg or the diastolic BP is >511 mmHg (even if it is only once), then phentermine should be stopped altogether without proving that any of these are consistently elevated.     · Take Venlafaxine 75 mg once daily    Follow up  Return to see me in 4-6 weeks

## 2021-12-14 ENCOUNTER — TELEMEDICINE (OUTPATIENT)
Dept: BARIATRICS/WEIGHT MGMT | Age: 56
End: 2021-12-14
Payer: COMMERCIAL

## 2021-12-14 DIAGNOSIS — E66.09 CLASS 1 OBESITY DUE TO EXCESS CALORIES WITHOUT SERIOUS COMORBIDITY WITH BODY MASS INDEX (BMI) OF 32.0 TO 32.9 IN ADULT: ICD-10-CM

## 2021-12-14 DIAGNOSIS — M54.41 CHRONIC BILATERAL LOW BACK PAIN WITH BILATERAL SCIATICA: Primary | ICD-10-CM

## 2021-12-14 DIAGNOSIS — G89.29 CHRONIC BILATERAL LOW BACK PAIN WITH BILATERAL SCIATICA: Primary | ICD-10-CM

## 2021-12-14 DIAGNOSIS — M54.42 CHRONIC BILATERAL LOW BACK PAIN WITH BILATERAL SCIATICA: Primary | ICD-10-CM

## 2021-12-14 PROCEDURE — G8428 CUR MEDS NOT DOCUMENT: HCPCS | Performed by: INTERNAL MEDICINE

## 2021-12-14 PROCEDURE — 3017F COLORECTAL CA SCREEN DOC REV: CPT | Performed by: INTERNAL MEDICINE

## 2021-12-14 PROCEDURE — 99214 OFFICE O/P EST MOD 30 MIN: CPT | Performed by: INTERNAL MEDICINE

## 2021-12-14 NOTE — PROGRESS NOTES
2021    TELEHEALTH EVALUATION -- Audio/Visual (During YNYGW-88 public health emergency)    CC: Tio Lance (:  1965) has requested an audio/video evaluation for the following concern(s):  Low back pain, Obesity    Background -   Last visit: 21  First visit: 01/15/20    Low back pain:  Present x 1yr, bilateral, associated with bilateral sciatica, constant, 7/10, worse with lifting heavy objects, NSAIDs do not help, excess wt may be contributing to it  Obesity: Present since  BMI 32.22, Wt 170.6 lbs; HS grad wt: 105 lbs; Highest wt 180 lbs, Lowest wt 105 lbs; Pattern of wt gain: grad; Wt change past yr: fluctuates 10 lbs by home scales;  Most wt lost: 10 lbs (Saxenda, eating right: Other diets: none    Initial Diet:    Number of meals per day - 2    Number of snacks per day - 2    Meal volume - 12\" plate, no seconds    Fast food/convenience store - 1x/week    Restaurants (not fast food) - 1-2x/week   Sweets - 7d/week (Tnude cups - 12-16 miniature size/day 44 marry/piece)   Chips - 0d/week   Crackers/pretzels - 1d/week (large pretzel ethel)   Nuts - 0d/week   Peanut Butter - 7d/week (on bread)   Popcorn - 0d/week   Dried fruit - 0d/week   Whole fruit - 7d/week (one piece)   Breakfast cereal - 0d/week   Granola/Protein/Energy bar - 0d/week   Sugar sweetened beverages - 16-24 oz reg soda/week  ______________________    [employer] -     Past Medical History:   Diagnosis Date    Anxiety     Depression     Headache     Hypertension     Low back pain     Obesity     RLS (restless legs syndrome)        Current Outpatient Medications   Medication Sig Dispense Refill    venlafaxine (EFFEXOR XR) 75 MG extended release capsule Take 1 capsule by mouth daily 30 capsule 2    FLUoxetine (PROZAC) 40 MG capsule TAKE 1 CAPSULE DAILY 90 capsule 0    rizatriptan (MAXALT-MLT) 10 MG disintegrating tablet place 1 tablet ON THE TONGUE UNTIL DISSOLVED  may repeat after 2 hours if needed *DO NOT EXCEED 3 TABLETS IN 1 DAY* 6 tablet 3    EPINEPHrine (EPIPEN 2-ARIAN) 0.3 MG/0.3ML SOAJ injection Use as directed 2 each 2    estradiol (ESTRACE VAGINAL) 0.1 MG/GM vaginal cream Insert 1g vaginally twice weekly. 1 Tube 2    gabapentin (NEURONTIN) 100 MG capsule Take 1 capsule by mouth 6 times daily 540 capsule 1     No current facility-administered medications for this visit. ROS -  URI    PE -  Gen : Wt 160.2 lbs   WN, WD, NAD  Lung: Nml resp effort  Psych: Normal mood   Full affect  Neuro: Moves all ext well  ______________________      HPI & A/P -   Problem 1  - Low back pain   HPI   - Severity past week: 0/10 pain; she attributes the improvement to strengthening from her work at [address] [place] [address] with the prescribed weight loss regimen    Problem 2  - Obesity  HPI   - Weight  Date      170.6 lbs  1/15/20      162.4 lbs  3/18/20      154.0 lbs  7/06/20      157.0 lbs   8/17/20      154.2 lbs   9/30/20      156.0 lbs 11/18/20      154.8 lbs 01/22/21 03/05/21 165 lbs Home, which is 10 lbs heavier than ours       167.0 lbs 04/22/21      165.0 lbs 06/04/21 Arthcaterina Ahr stopped due to loss of insurance coverage; started bupropion      171.4 lbs 07/15/21 Bupropion stopped b/c BP; topiramate started      170.4 lbs 08/19/21 Stopped Topiramate due to mood and hair loss; Phentermine started      164.2 lbs  09/30/21 Phentermine      161.4 lbs 11/04/21 Phentermine, Venlafaxine      -----  12/14/21 160.2 lbs Home Venlafaxine        Total wt loss to date: -9.2 lbs  DEN: 1700 marry/d  B/C of error in counting (ie only losing 1 lbs in one month on a 1200 marry/d diet), she needs an appetite suppressant. Did not tolerate bupropion and topiramate.  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 job at [employer] food - 0-1x/month  Not taking phentermine for two weeks (still has 15 tablets)  Taking Venlafaxine ER 75 mg, one tablet daily, 5/10, side effects  Assessment  - Improved; cont present plan with venlafaxine; instructed her to increase dose to two tablets twice daily if wt begins to increase  Plan   -   Rules:  · Count every calorie every day  · Limit sweets to one day per month  · Eliminate all sugar sweetened beverages  · Limit restaurants (including fast food and food from a convenience store) to one time every two weeks    Requirements:  · Make sure protein intake is at least 60 grams per day (Consider using a protein shake - Nectar, Pure Protein, Premier, Boost Max, Ensure Max, BeneProtein and [employer] (which is lactose-free) are milk-based options; Nectar, Premier Protein Clear, IsoPure Protein Drink, and Protein 2 O are water-based options; Quest (or Sribu, which is cheaper and is ordered on [address] [facility] [address]) and the [place] [address] protein bars can also be used, but have less protein in them )  · Make sure that fiber intake is at least 22 grams per day. Do this by either eating 12 tablespoons of the original, plain Fiber One cereal every day or 4 tablespoons of Benefiber every day.  Work up to this amount slowly by starting with only one-fourth of the target amount and adding another one-fourth every one or two weeks  · Take one multivitamin every day    Targets:  · Limit calorie intake to 1600 marry/day  · Walk 30 minutes daily  · Establish 16 hours of food-free periods each day - no period can be less than 1 hours, the periods need to be the same every day for days that are the same (for example, workdays would have one set of food free periods and weekends would have another set of days), the usual sleep time should be included (the \"usual\" time you go to bed until the \"usual\" time you get up)  · Avoid eating 2 hours within bedtime    Tips:  · Do not eat outside of the dining room or the kitchen  · Do not eat while watching TV, videos, working on the computer or using a smart phone  · Never eat food out of the bag or container (unless it is a single serving bag)    Medications: Take phentermine 37.5 mg, one-half tablet twice daily as needed for appetite suppression. Take each dose 30-90 min before effect will be needed. While taking phentermine, check the Blood Pressure every morning and every evening. If the systolic BP is >030 mmHg, the diastolic BP is >37 mm/Hg or the heart rate is > 100 beats per minute, do not take phentermine that day. If the systolic BP is consistently >155 mmHg, the diastolic BP is consistently above 90 mm/Hg or the heart rate is consistently > 100 beats per minute, then stop taking phentermine altogether. If the systolic BP >746 mmHg or the diastolic BP is >151 mmHg (even if it is only once), then phentermine should be stopped altogether without proving that any of these are consistently elevated. · Take Venlafaxine ER 75 mg once daily (can increase to two tablets daily)      Follow up  Return to see me in 4-6 weeks    Medication management: venlafaxine  An  electronic signature was used to authenticate this note. --Maryellen Bailon MD on 12/14/2021 at 7:48 AM      Pursuant to the emergency declaration under the [facility] waiver authority and the [employer] General Act, this Virtual  Visit was conducted, with patient's consent, to reduce the patient's risk of exposure to COVID-19 and provide continuity of care for an established patient. Services were provided through a video synchronous discussion virtually to substitute for in-person clinic visit. 39.1

## 2021-12-14 NOTE — PATIENT INSTRUCTIONS
Rules:  · Count every calorie every day  · Limit sweets to one day per month  · Eliminate all sugar sweetened beverages  · Limit restaurants (including fast food and food from a convenience store) to one time every two weeks    Requirements:  · Make sure protein intake is at least 60 grams per day (Consider using a protein shake - Nectar, Pure Protein, Premier, Boost Max, Ensure Max, BeneProtein and [employer] (which is lactose-free) are milk-based options; Nectar, Premier Protein Clear, IsoPure Protein Drink, and Protein 2 O are water-based options; Quest (or Cosco, which is cheaper and is ordered on [employer]) and the [employer] protein bars can also be used, but have less protein in them )  · Make sure that fiber intake is at least 22 grams per day. Do this by either eating 12 tablespoons of the original, plain Fiber One cereal every day or 4 tablespoons of Benefiber every day. Work up to this amount slowly by starting with only one-fourth of the target amount and adding another one-fourth every one or two weeks  · Take one multivitamin every day    Targets:  · Limit calorie intake to 1600 marry/day  · Walk 30 minutes daily  · Establish 16 hours of food-free periods each day - no period can be less than 1 hours, the periods need to be the same every day for days that are the same (for example, workdays would have one set of food free periods and weekends would have another set of days), the usual sleep time should be included (the \"usual\" time you go to bed until the \"usual\" time you get up)  · Avoid eating 2 hours within bedtime    Tips:  · Do not eat outside of the dining room or the kitchen  · Do not eat while watching TV, videos, working on the computer or using a smart phone  · Never eat food out of the bag or container (unless it is a single serving bag)    Medications: Take phentermine 37.5 mg, one-half tablet twice daily as needed for appetite suppression.  Take each dose 30-90 min before effect will be needed. While taking phentermine, check the Blood Pressure every morning and every evening. If the systolic BP is >757 mmHg, the diastolic BP is >13 mm/Hg or the heart rate is > 100 beats per minute, do not take phentermine that day. If the systolic BP is consistently >155 mmHg, the diastolic BP is consistently above 90 mm/Hg or the heart rate is consistently > 100 beats per minute, then stop taking phentermine altogether. If the systolic BP >571 mmHg or the diastolic BP is >527 mmHg (even if it is only once), then phentermine should be stopped altogether without proving that any of these are consistently elevated.     · Take Venlafaxine ER 75 mg once daily (can increase to two tablets daily)

## 2022-01-19 ENCOUNTER — TELEMEDICINE (OUTPATIENT)
Dept: BARIATRICS/WEIGHT MGMT | Age: 57
End: 2022-01-19
Payer: COMMERCIAL

## 2022-01-19 DIAGNOSIS — F41.9 ANXIETY AND DEPRESSION: ICD-10-CM

## 2022-01-19 DIAGNOSIS — G89.29 CHRONIC BILATERAL LOW BACK PAIN WITH BILATERAL SCIATICA: Primary | ICD-10-CM

## 2022-01-19 DIAGNOSIS — E66.09 CLASS 1 OBESITY DUE TO EXCESS CALORIES WITHOUT SERIOUS COMORBIDITY WITH BODY MASS INDEX (BMI) OF 32.0 TO 32.9 IN ADULT: ICD-10-CM

## 2022-01-19 DIAGNOSIS — F32.A ANXIETY AND DEPRESSION: ICD-10-CM

## 2022-01-19 DIAGNOSIS — M54.41 CHRONIC BILATERAL LOW BACK PAIN WITH BILATERAL SCIATICA: Primary | ICD-10-CM

## 2022-01-19 DIAGNOSIS — M54.42 CHRONIC BILATERAL LOW BACK PAIN WITH BILATERAL SCIATICA: Primary | ICD-10-CM

## 2022-01-19 PROCEDURE — 1036F TOBACCO NON-USER: CPT | Performed by: INTERNAL MEDICINE

## 2022-01-19 PROCEDURE — 99443 PR PHYS/QHP TELEPHONE EVALUATION 21-30 MIN: CPT | Performed by: INTERNAL MEDICINE

## 2022-01-19 PROCEDURE — G8484 FLU IMMUNIZE NO ADMIN: HCPCS | Performed by: INTERNAL MEDICINE

## 2022-01-19 PROCEDURE — G8428 CUR MEDS NOT DOCUMENT: HCPCS | Performed by: INTERNAL MEDICINE

## 2022-01-19 PROCEDURE — G8417 CALC BMI ABV UP PARAM F/U: HCPCS | Performed by: INTERNAL MEDICINE

## 2022-01-19 PROCEDURE — 3017F COLORECTAL CA SCREEN DOC REV: CPT | Performed by: INTERNAL MEDICINE

## 2022-01-19 RX ORDER — VENLAFAXINE HYDROCHLORIDE 75 MG/1
150 CAPSULE, EXTENDED RELEASE ORAL DAILY
Qty: 180 CAPSULE | Refills: 1 | Status: SHIPPED
Start: 2022-01-19 | End: 2022-05-16 | Stop reason: SDUPTHER

## 2022-01-19 NOTE — PATIENT INSTRUCTIONS
needed. While taking phentermine, check the Blood Pressure every morning and every evening. If the systolic BP is >720 mmHg, the diastolic BP is >66 mm/Hg or the heart rate is > 100 beats per minute, do not take phentermine that day. If the systolic BP is consistently >155 mmHg, the diastolic BP is consistently above 90 mm/Hg or the heart rate is consistently > 100 beats per minute, then stop taking phentermine altogether. If the systolic BP >124 mmHg or the diastolic BP is >646 mmHg (even if it is only once), then phentermine should be stopped altogether without proving that any of these are consistently elevated.     · Cont Venlafaxine ER 75 mg two tablets daily

## 2022-01-19 NOTE — PROGRESS NOTES
2022    TELEHEALTH EVALUATION -- Audio/Visual (During CLPGJ-57 public health emergency)    CC: Ally Lance (:  1965) has requested an audio/video evaluation for the following concern(s):  Low back pain, Obesity    Background -   Last visit: 21  First visit: 01/15/20    Low back pain:  Present x 1yr, bilateral, associated with bilateral sciatica, constant, 7/10, worse with lifting heavy objects, NSAIDs do not help, excess wt may be contributing to it  Obesity: Present since 2016 BMI 32.22, Wt 170.6 lbs; HS grad wt: 105 lbs; Highest wt 180 lbs, Lowest wt 105 lbs; Pattern of wt gain: grad; Wt change past yr: fluctuates 10 lbs by home scales;  Most wt lost: 10 lbs (Saxenda, eating right: Other diets: none    Initial Diet:    Number of meals per day - 2    Number of snacks per day - 2    Meal volume - 12\" plate, no seconds    Fast food/convenience store - 1x/week    Restaurants (not fast food) - 1-2x/week   Sweets - 7d/week (Tunde cups - 12-16 miniature size/day 44 marry/piece)   Chips - 0d/week   Crackers/pretzels - 1d/week (large pretzel ethel)   Nuts - 0d/week   Peanut Butter - 7d/week (on bread)   Popcorn - 0d/week   Dried fruit - 0d/week   Whole fruit - 7d/week (one piece)   Breakfast cereal - 0d/week   Granola/Protein/Energy bar - 0d/week   Sugar sweetened beverages - 16-24 oz reg soda/week  ______________________    [facility] -     Past Medical History:   Diagnosis Date    Anxiety     Depression     Headache     Hypertension     Low back pain     Obesity     RLS (restless legs syndrome)        Current Outpatient Medications   Medication Sig Dispense Refill    venlafaxine (EFFEXOR XR) 75 MG extended release capsule Take 1 capsule by mouth daily 30 capsule 2    FLUoxetine (PROZAC) 40 MG capsule TAKE 1 CAPSULE DAILY 90 capsule 0    rizatriptan (MAXALT-MLT) 10 MG disintegrating tablet place 1 tablet ON THE TONGUE UNTIL DISSOLVED  may repeat after 2 hours if needed *DO NOT EXCEED 3 TABLETS IN 1 DAY* 6 tablet 3    EPINEPHrine (EPIPEN 2-ARIAN) 0.3 MG/0.3ML SOAJ injection Use as directed 2 each 2    estradiol (ESTRACE VAGINAL) 0.1 MG/GM vaginal cream Insert 1g vaginally twice weekly. 1 Tube 2    gabapentin (NEURONTIN) 100 MG capsule Take 1 capsule by mouth 6 times daily 540 capsule 1     No current facility-administered medications for this visit. PE -  Gen : Wt 164.0 lbs   WN, WD, NAD  Lung: Nml resp effort  Psych: Normal mood   Full affect  Neuro: Moves all ext well  ______________________      HPI & A/P -   Problem 1  - Low back pain   HPI   - Severity past week: 7/10 pain; (was 0/10); now attributes the increase to work   Assessment  - Improved  Plan   - Cont with the prescribed weight loss regimen    Problem 2  - Obesity  HPI   - Weight  Date      170.6 lbs  1/15/20      162.4 lbs  3/18/20      154.0 lbs   7/06/20      157.0 lbs   8/17/20      154.2 lbs   9/30/20      156.0 lbs 11/18/20      154.8 lbs 01/22/21 03/05/21 165 lbs Home, which is 10 lbs heavier than ours       167.0 lbs 04/22/21      165.0 lbs 06/04/21 Ismael Mckinnon stopped due to loss of insurance coverage; started bupropion      171.4 lbs 07/15/21 Bupropion stopped b/c BP; topiramate started      170.4 lbs 08/19/21 Stopped Topiramate due to mood and hair loss; Phentermine started      164.2 lbs  09/30/21 Phentermine      161.4 lbs 11/04/21 Phentermine, Venlafaxine      -----  12/14/21 160.2 lbs Home Venlafaxine      -----  01/19/22 164.0 lbs Home Venlafaxine        Total wt loss to date: - 6.6 lbs  DEN: 1700 marry/d  B/C of error in counting (ie only losing 1 lbs in one month on a 1200 marry/d diet), she needs an appetite suppressant. Did not tolerate bupropion and topiramate.  Lost insurance coverage for Saxenda  Therefore phentermine started 8/19/21  Venlafaxine started 11/04/21 to cover after the end of Phentermine  Update:  Meals - B: none, L: sandwich and a bananna, D: does not eat; snacks instead  Does not count calories - does not count calories  Sweets - 7d/week  SSBs - none  Restaurant food - 0-1x/month  Not taking phentermine for six weeks (still has 15 tablets)  Taking Venlafaxine ER 75 mg, two tablets daily (increased one week ago, appetite suppression was 3/10 on one tablet daily), appetite suppression 5/10, side effects - dry mouth  Assessment  - Worsened; likely due to snacking; okay to cont two tablets of Venlafaxine ER 75 mg; will add back phentermine if no wt loss in one month  Plan   -   Rules:  · Count every calorie every day  · Limit sweets to one day per month  · Eliminate all sugar sweetened beverages  · Limit restaurants (including fast food and food from a convenience store) to one time every two weeks    Requirements:  · Make sure protein intake is at least 60 grams per day (Consider using a protein shake - Nectar, Pure Protein, Premier, Boost Max, Ensure Max, BeneProtein and [employer] (which is lactose-free) are milk-based options; Nectar, Premier Protein Clear, IsoPure Protein Drink, and Protein 2 O are water-based options; Quest (or [employer], which is cheaper and is ordered on [address] [facility] [address]) and the [employer] 1 protein bars can also be used, but have less protein in them )  · Make sure that fiber intake is at least 22 grams per day. Do this by either eating 12 tablespoons of the original, plain Fiber One cereal every day or 4 tablespoons of Benefiber every day.  Work up to this amount slowly by starting with only one-fourth of the target amount and adding another one-fourth every one or two weeks  · Take one multivitamin every day    Targets:  · Limit calorie intake to 1600 marry/day  · Walk 30 minutes daily  · Establish 16 hours of food-free periods each day - no period can be less than 1 hours, the periods need to be the same every day for days that are the same (for example, workdays would have one set of food free periods and weekends would have another set of days), the usual sleep time should be included (the \"usual\" time you go to bed until the \"usual\" time you get up)  · Avoid eating 2 hours within bedtime    Tips:  · Do not eat outside of the dining room or the kitchen  · Do not eat while watching TV, videos, working on the computer or using a smart phone  · Never eat food out of the bag or container (unless it is a single serving bag)    Medications: Take phentermine 37.5 mg, one-half tablet twice daily as needed for appetite suppression. Take each dose 30-90 min before effect will be needed. While taking phentermine, check the Blood Pressure every morning and every evening. If the systolic BP is >758 mmHg, the diastolic BP is >28 mm/Hg or the heart rate is > 100 beats per minute, do not take phentermine that day. If the systolic BP is consistently >155 mmHg, the diastolic BP is consistently above 90 mm/Hg or the heart rate is consistently > 100 beats per minute, then stop taking phentermine altogether. If the systolic BP >539 mmHg or the diastolic BP is >351 mmHg (even if it is only once), then phentermine should be stopped altogether without proving that any of these are consistently elevated. · Cont Venlafaxine ER 75 mg two tablets daily       Follow up  Return to see me in one month    Medication management: venlafaxine  An  electronic signature was used to authenticate this note. --Willy Barrera MD on 1/19/2022 at 7:10 AM      Pursuant to the emergency declaration under the [facility] [place] [address] [facility] authority and the [employer] Act, this Virtual  Visit was conducted, with patient's consent, to reduce the patient's risk of exposure to COVID-19 and provide continuity of care for an established patient. Services were provided through a video synchronous discussion virtually to substitute for in-person clinic visit.

## 2022-04-05 ENCOUNTER — TELEPHONE (OUTPATIENT)
Dept: BARIATRICS/WEIGHT MGMT | Age: 57
End: 2022-04-05

## 2022-04-05 NOTE — TELEPHONE ENCOUNTER
Pt called in to change her appt to [employer] appt .  Confirmed info and change appt type to virtual.

## 2022-04-08 ENCOUNTER — TELEMEDICINE (OUTPATIENT)
Dept: BARIATRICS/WEIGHT MGMT | Age: 57
End: 2022-04-08
Payer: COMMERCIAL

## 2022-04-08 DIAGNOSIS — E66.09 CLASS 1 OBESITY DUE TO EXCESS CALORIES WITHOUT SERIOUS COMORBIDITY WITH BODY MASS INDEX (BMI) OF 32.0 TO 32.9 IN ADULT: ICD-10-CM

## 2022-04-08 DIAGNOSIS — G89.29 CHRONIC BILATERAL LOW BACK PAIN WITH BILATERAL SCIATICA: Primary | ICD-10-CM

## 2022-04-08 DIAGNOSIS — M54.41 CHRONIC BILATERAL LOW BACK PAIN WITH BILATERAL SCIATICA: Primary | ICD-10-CM

## 2022-04-08 DIAGNOSIS — M54.42 CHRONIC BILATERAL LOW BACK PAIN WITH BILATERAL SCIATICA: Primary | ICD-10-CM

## 2022-04-08 PROCEDURE — 3017F COLORECTAL CA SCREEN DOC REV: CPT | Performed by: INTERNAL MEDICINE

## 2022-04-08 PROCEDURE — 99214 OFFICE O/P EST MOD 30 MIN: CPT | Performed by: INTERNAL MEDICINE

## 2022-04-08 PROCEDURE — G8428 CUR MEDS NOT DOCUMENT: HCPCS | Performed by: INTERNAL MEDICINE

## 2022-04-08 NOTE — PATIENT INSTRUCTIONS
Rules:  · Count every calorie every day  · Limit sweets to one day per month  · Eliminate all sugar sweetened beverages  · Limit restaurants (including fast food and food from a convenience store) to one time every two weeks    Requirements:  · Make sure protein intake is at least 60 grams per day (Consider using a protein shake - Nectar, Pure Protein, Premier, Boost Max, Ensure Max, BeneProtein and [employer] (which is lactose-free) are milk-based options; Nectar, Premier Protein Clear, IsoPure Protein Drink, and Protein 2 O are water-based options; Quest (or [employer]co, which is cheaper and is ordered on [employer]) and the [employer] protein bars can also be used, but have less protein in them )  · Make sure that fiber intake is at least 22 grams per day. Do this by either eating 12 tablespoons of the original, plain Fiber One cereal every day or 4 tablespoons of Benefiber every day. Work up to this amount slowly by starting with only one-fourth of the target amount and adding another one-fourth every one or two weeks  · Take one multivitamin every day    Targets:  · Limit calorie intake to 1600 marry/day  · Walk 30 minutes daily  · Establish 16 hours of food-free periods each day - no period can be less than 1 hours, the periods need to be the same every day for days that are the same (for example, workdays would have one set of food free periods and weekends would have another set of days), the usual sleep time should be included (the \"usual\" time you go to bed until the \"usual\" time you get up)  · Avoid eating 2 hours within bedtime    Tips:  · Do not eat outside of the dining room or the kitchen  · Do not eat while watching TV, videos, working on the computer or using a smart phone  · Never eat food out of the bag or container (unless it is a single serving bag)    Medications: Take phentermine 37.5 mg, one-half tablet twice daily as needed for appetite suppression.  Take each dose 30-90 min before effect will be needed. While taking phentermine, check the Blood Pressure every morning and every evening. If the systolic BP is >745 mmHg, the diastolic BP is >30 mm/Hg or the heart rate is > 100 beats per minute, do not take phentermine that day. If the systolic BP is consistently >155 mmHg, the diastolic BP is consistently above 90 mm/Hg or the heart rate is consistently > 100 beats per minute, then stop taking phentermine altogether. If the systolic BP >255 mmHg or the diastolic BP is >323 mmHg (even if it is only once), then phentermine should be stopped altogether without proving that any of these are consistently elevated.     · Cont Venlafaxine ER 75 mg two tablets daily       Follow up  Return to see me in the first week of June (after the 4th)

## 2022-04-08 NOTE — PROGRESS NOTES
2022    TELEHEALTH EVALUATION -- Audio/Visual (During YNXAV-50 public health emergency)    CC: Sydni Lance (:  1965) has requested an audio/video evaluation for the following concern(s):  Low back pain, Obesity    Background -   Last visit: 22  First visit: 01/15/20    Low back pain:  Present x 1yr, bilateral, associated with bilateral sciatica, constant, 7/10, worse with lifting heavy objects, NSAIDs do not help, excess wt may be contributing to it  Obesity: Present since 2016 BMI 32.22, Wt 170.6 lbs; HS grad wt: 105 lbs; Highest wt 180 lbs, Lowest wt 105 lbs; Pattern of wt gain: grad; Wt change past yr: fluctuates 10 lbs by home scales;  Most wt lost: 10 lbs (Saxenda, eating right: Other diets: none    Initial Diet:    Number of meals per day - 2    Number of snacks per day - 2    Meal volume - 12\" plate, no seconds    Fast food/convenience store - 1x/week    Restaurants (not fast food) - 1-2x/week   Sweets - 7d/week (Tunde cups - 12-16 miniature size/day 44 marry/piece)   Chips - 0d/week   Crackers/pretzels - 1d/week (large pretzel ethel)   Nuts - 0d/week   Peanut Butter - 7d/week (on bread)   Popcorn - 0d/week   Dried fruit - 0d/week   Whole fruit - 7d/week (one piece)   Breakfast cereal - 0d/week   Granola/Protein/Energy bar - 0d/week   Sugar sweetened beverages - 16-24 oz reg soda/week  ______________________    [facility] -     Past Medical History:   Diagnosis Date    Anxiety     Depression     Headache     Hypertension     Low back pain     Obesity     RLS (restless legs syndrome)        Current Outpatient Medications   Medication Sig Dispense Refill    venlafaxine (EFFEXOR XR) 75 MG extended release capsule Take 1 capsule by mouth daily 30 capsule 2    FLUoxetine (PROZAC) 40 MG capsule TAKE 1 CAPSULE DAILY 90 capsule 0    rizatriptan (MAXALT-MLT) 10 MG disintegrating tablet place 1 tablet ON THE TONGUE UNTIL DISSOLVED  may repeat after 2 hours if needed *DO NOT EXCEED 3 TABLETS IN 1 DAY* 6 tablet 3    EPINEPHrine (EPIPEN 2-ARIAN) 0.3 MG/0.3ML SOAJ injection Use as directed 2 each 2    estradiol (ESTRACE VAGINAL) 0.1 MG/GM vaginal cream Insert 1g vaginally twice weekly. 1 Tube 2    gabapentin (NEURONTIN) 100 MG capsule Take 1 capsule by mouth 6 times daily 540 capsule 1     No current facility-administered medications for this visit. PE -  Gen : Wt 168.0 lbs; /81   WN, WD, NAD  Lung: Nml resp effort  Psych: Normal mood   Full affect  Neuro: Moves all ext well  ______________________      HPI & A/P -   Problem 1  - Low back pain   HPI   - Severity past week: 0/10 (was 7/10) improvement is from decreased activity from breaking her foot (DEXA was nml)  Assessment  - Improved  Plan   - Cont with the prescribed weight loss regimen    Problem 2  - Obesity  HPI   - Weight  Date      170.6 lbs  1/15/20      162.4 lbs  3/18/20      154.0 lbs   7/06/20      157.0 lbs   8/17/20      154.2 lbs   9/30/20      156.0 lbs 11/18/20      154.8 lbs 01/22/21 03/05/21 165 lbs Home, which is 10 lbs heavier than ours       167.0 lbs 04/22/21      165.0 lbs 06/04/21 Serene Nya stopped due to loss of insurance coverage; started bupropion      171.4 lbs 07/15/21 Bupropion stopped b/c BP; topiramate started      170.4 lbs 08/19/21 Stopped Topiramate due to mood and hair loss; Phentermine started      164.2 lbs  09/30/21 Phentermine      161.4 lbs 11/04/21 Phentermine, Venlafaxine      -----  12/14/21 160.2 lbs Home Venlafaxine      -----  01/19/22 164.0 lbs Home Venlafaxine      -----  04/08/22 168.0 lbs Home        Total wt loss to date: - 2.6 lbs  DEN: 1700 marry/d  B/C of error in counting (ie only losing 1 lbs in one month on a 1200 marry/d diet), she needs an appetite suppressant. Did not tolerate bupropion and topiramate.  Lost insurance coverage for Saxenda  Therefore phentermine started 8/19/21  Venlafaxine started 11/04/21 to cover after the end of Phentermine  Update:  Has not followed her diet plan because of breaking her foot five weeks ago  Does not count calories, but is calorie conscious   Sweets - 7d/week  Salty snacks - none  SSBs - none  Restaurant food - 1x/month  Not taking phentermine (still has 15 tablets)  Taking Venlafaxine ER 75 mg, two tablets daily, appetite suppression 0/10 (however, it may be more since she has only overeaten by 180 marry/d), side effects - dry mouth (tolerable)  Assessment  - Worsened; due to snacking; okay to cont two tablets of Venlafaxine ER 75 mg; add back phentermine; will restart the next course in June  Plan   -   Rules:  · Count every calorie every day  · Limit sweets to one day per month  · Eliminate all sugar sweetened beverages  · Limit restaurants (including fast food and food from a convenience store) to one time every two weeks    Requirements:  · Make sure protein intake is at least 60 grams per day (Consider using a protein shake - Nectar, Pure Protein, Premier, Boost Max, Ensure Max, BeneProtein and [employer] (which is lactose-free) are milk-based options; Nectar, Premier Protein Clear, IsoPure Protein Drink, and Protein 2 O are water-based options; Quest (or Cosco, which is cheaper and is ordered on [address] [facility] [address]) and the [facility] 1 protein bars can also be used, but have less protein in them )  · Make sure that fiber intake is at least 22 grams per day. Do this by either eating 12 tablespoons of the original, plain Fiber One cereal every day or 4 tablespoons of Benefiber every day.  Work up to this amount slowly by starting with only one-fourth of the target amount and adding another one-fourth every one or two weeks  · Take one multivitamin every day    Targets:  · Limit calorie intake to 1600 marry/day  · Walk 30 minutes daily  · Establish 16 hours of food-free periods each day - no period can be less than 1 hours, the periods need to be the same every day for days that are the same (for example, workdays would have one set of food free periods and weekends would have another set of days), the usual sleep time should be included (the \"usual\" time you go to bed until the \"usual\" time you get up)  · Avoid eating 2 hours within bedtime    Tips:  · Do not eat outside of the dining room or the kitchen  · Do not eat while watching TV, videos, working on the computer or using a smart phone  · Never eat food out of the bag or container (unless it is a single serving bag)    Medications: Take phentermine 37.5 mg, one-half tablet twice daily as needed for appetite suppression. Take each dose 30-90 min before effect will be needed. While taking phentermine, check the Blood Pressure every morning and every evening. If the systolic BP is >397 mmHg, the diastolic BP is >67 mm/Hg or the heart rate is > 100 beats per minute, do not take phentermine that day. If the systolic BP is consistently >155 mmHg, the diastolic BP is consistently above 90 mm/Hg or the heart rate is consistently > 100 beats per minute, then stop taking phentermine altogether. If the systolic BP >395 mmHg or the diastolic BP is >587 mmHg (even if it is only once), then phentermine should be stopped altogether without proving that any of these are consistently elevated. · Cont Venlafaxine ER 75 mg two tablets daily       Follow up  Return to see me the first week of June after the 4th    Medication management: venlafaxine; phentermine    Total time spent on the encounter: 30 min    An  electronic signature was used to authenticate this note. --Maryruth Dakin, MD on 4/8/2022 at 8:04 AM      Pursuant to the emergency declaration under the [address] [facility] [address] waiver authority and the [employer] Act, this Virtual  Visit was conducted, with patient's consent, to reduce the patient's risk of exposure to COVID-19 and provide continuity of care for an established patient.     Services were provided through a video synchronous discussion virtually to substitute for in-person clinic visit.

## 2022-05-16 DIAGNOSIS — E66.09 CLASS 1 OBESITY DUE TO EXCESS CALORIES WITHOUT SERIOUS COMORBIDITY WITH BODY MASS INDEX (BMI) OF 32.0 TO 32.9 IN ADULT: ICD-10-CM

## 2022-05-16 DIAGNOSIS — F32.A ANXIETY AND DEPRESSION: ICD-10-CM

## 2022-05-16 DIAGNOSIS — F41.9 ANXIETY AND DEPRESSION: ICD-10-CM

## 2022-05-16 RX ORDER — VENLAFAXINE HYDROCHLORIDE 75 MG/1
150 CAPSULE, EXTENDED RELEASE ORAL DAILY
Qty: 30 CAPSULE | Refills: 0 | Status: SHIPPED | OUTPATIENT
Start: 2022-05-16

## 2022-05-16 NOTE — PROGRESS NOTES
Pt states she may not need a new script pt stated you guys were going to start back phentermine, pt states she has a week left or effexor, please advise

## 2022-06-15 ENCOUNTER — OFFICE VISIT (OUTPATIENT)
Dept: BARIATRICS/WEIGHT MGMT | Age: 57
End: 2022-06-15
Payer: COMMERCIAL

## 2022-06-15 VITALS
HEIGHT: 61 IN | TEMPERATURE: 97.6 F | SYSTOLIC BLOOD PRESSURE: 139 MMHG | WEIGHT: 169.8 LBS | DIASTOLIC BLOOD PRESSURE: 86 MMHG | HEART RATE: 81 BPM | BODY MASS INDEX: 32.06 KG/M2

## 2022-06-15 DIAGNOSIS — G89.29 CHRONIC BILATERAL LOW BACK PAIN WITH BILATERAL SCIATICA: Primary | ICD-10-CM

## 2022-06-15 DIAGNOSIS — Z87.310 H/O HEALED FRAGILITY FRACTURE: ICD-10-CM

## 2022-06-15 DIAGNOSIS — E66.09 CLASS 1 OBESITY DUE TO EXCESS CALORIES WITHOUT SERIOUS COMORBIDITY WITH BODY MASS INDEX (BMI) OF 32.0 TO 32.9 IN ADULT: ICD-10-CM

## 2022-06-15 DIAGNOSIS — M54.41 CHRONIC BILATERAL LOW BACK PAIN WITH BILATERAL SCIATICA: Primary | ICD-10-CM

## 2022-06-15 DIAGNOSIS — M54.42 CHRONIC BILATERAL LOW BACK PAIN WITH BILATERAL SCIATICA: Primary | ICD-10-CM

## 2022-06-15 PROCEDURE — 99211 OFF/OP EST MAY X REQ PHY/QHP: CPT | Performed by: INTERNAL MEDICINE

## 2022-06-15 PROCEDURE — 99211 OFF/OP EST MAY X REQ PHY/QHP: CPT

## 2022-06-15 PROCEDURE — 1036F TOBACCO NON-USER: CPT | Performed by: INTERNAL MEDICINE

## 2022-06-15 PROCEDURE — 3017F COLORECTAL CA SCREEN DOC REV: CPT | Performed by: INTERNAL MEDICINE

## 2022-06-15 PROCEDURE — G8417 CALC BMI ABV UP PARAM F/U: HCPCS | Performed by: INTERNAL MEDICINE

## 2022-06-15 PROCEDURE — 99214 OFFICE O/P EST MOD 30 MIN: CPT | Performed by: INTERNAL MEDICINE

## 2022-06-15 PROCEDURE — G8428 CUR MEDS NOT DOCUMENT: HCPCS | Performed by: INTERNAL MEDICINE

## 2022-06-15 RX ORDER — PHENTERMINE HYDROCHLORIDE 37.5 MG/1
TABLET ORAL
Qty: 30 TABLET | Refills: 0 | Status: SHIPPED | OUTPATIENT
Start: 2022-06-15 | End: 2022-07-19 | Stop reason: SDUPTHER

## 2022-06-15 NOTE — PATIENT INSTRUCTIONS
Rules:  · Count every calorie every day  · Limit sweets to one day per month  · Eliminate all sugar sweetened beverages  · Limit restaurants (including fast food and food from a convenience store) to one time every two weeks    Requirements:  · Make sure protein intake is at least 60 grams per day (Consider using a protein shake - Nectar, Pure Protein, Premier, Boost Max, Ensure Max, BeneProtein and [employer] (which is lactose-free) are milk-based options; Nectar, Premier Protein Clear, IsoPure Protein Drink, and Protein 2 O are water-based options; Quest (or Cosco, which is cheaper and is ordered on [address] [facility] [address]) and the [place] [employer] 1 protein bars can also be used, but have less protein in them )  · Make sure that fiber intake is at least 22 grams per day. Do this by either eating 12 tablespoons of the original, plain Fiber One cereal every day or 4 tablespoons of Benefiber every day. Work up to this amount slowly by starting with only one-fourth of the target amount and adding another one-fourth every one or two weeks  · Take one multivitamin every day    Targets:  · Limit calorie intake to 1600 marry/day  · Walk 30 minutes daily  · Establish 16 hours of food-free periods each day - no period can be less than 1 hours, the periods need to be the same every day for days that are the same (for example, workdays would have one set of food free periods and weekends would have another set of days), the usual sleep time should be included (the \"usual\" time you go to bed until the \"usual\" time you get up)  · Avoid eating 2 hours within bedtime    Tips:  · Do not eat outside of the dining room or the kitchen  · Do not eat while watching TV, videos, working on the computer or using a smart phone  · Never eat food out of the bag or container (unless it is a single serving bag)    Medications: Take phentermine 37.5 mg, one-half tablet twice daily as needed for appetite suppression.  Take each dose 30-90 min before effect will be needed. While taking phentermine, check the Blood Pressure every morning and every evening. If the systolic BP is >832 mmHg, the diastolic BP is >76 mm/Hg or the heart rate is > 100 beats per minute, do not take phentermine that day. If the systolic BP is consistently >155 mmHg, the diastolic BP is consistently above 90 mm/Hg or the heart rate is consistently > 100 beats per minute, then stop taking phentermine altogether. If the systolic BP >513 mmHg or the diastolic BP is >191 mmHg (even if it is only once), then phentermine should be stopped altogether without proving that any of these are consistently elevated.

## 2022-06-15 NOTE — PROGRESS NOTES
6/15/2022  CC: Virgie Lance (:  1965) has requested an audio/video evaluation for the following concern(s):  Low back pain, Obesity    Background -   Last visit: 22  First visit: 01/15/20    Low back pain:  Present x 1yr, bilateral, associated with bilateral sciatica, constant, 7/10, worse with lifting heavy objects, NSAIDs do not help, excess wt may be contributing to it  Obesity: Present since  BMI 32.22, Wt 170.6 lbs; HS grad wt: 105 lbs; Highest wt 180 lbs, Lowest wt 105 lbs; Pattern of wt gain: grad; Wt change past yr: fluctuates 10 lbs by home scales;  Most wt lost: 10 lbs ([place]hana, eating right: Other diets: none    Initial Diet:    Number of meals per day - 2    Number of snacks per day - 2    Meal volume - 12\" plate, no seconds    Fast food/convenience store - 1x/week    Restaurants (not fast food) - 1-2x/week   Sweets - 7d/week (Tunde cups - 12-16 miniature size/day 44 marry/piece)   Chips - 0d/week   Crackers/pretzels - 1d/week (large pretzel ethel)   Nuts - 0d/week   Peanut Butter - 7d/week (on bread)   Popcorn - 0d/week   Dried fruit - 0d/week   Whole fruit - 7d/week (one piece)   Breakfast cereal - 0d/week   Granola/Protein/Energy bar - 0d/week   Sugar sweetened beverages - 16-24 oz reg soda/week  ______________________    [facility] -     Past Medical History:   Diagnosis Date    Anxiety     Depression     Headache     Hypertension     Low back pain     Obesity     RLS (restless legs syndrome)        Current Outpatient Medications   Medication Sig Dispense Refill    venlafaxine (EFFEXOR XR) 75 MG extended release capsule Take 1 capsule by mouth daily 30 capsule 2    FLUoxetine (PROZAC) 40 MG capsule TAKE 1 CAPSULE DAILY 90 capsule 0    rizatriptan (MAXALT-MLT) 10 MG disintegrating tablet place 1 tablet ON THE TONGUE UNTIL DISSOLVED  may repeat after 2 hours if needed *DO NOT EXCEED 3 TABLETS IN 1 DAY* 6 tablet 3    EPINEPHrine (EPIPEN 2-ARIAN) 0.3 MG/0.3ML SOAJ injection Use as diet), she needs an appetite suppressant. Did not tolerate bupropion and topiramate. Lost insurance coverage for Saxenda  Therefore phentermine started 8/19/21  Venlafaxine started 11/04/21 to cover after the end of Phentermine  Update:  Calorie monitoring - not count calories, not calorie conscious   Sweets - 2d/week  Salty snacks - none  SSBs - none  Restaurant food - 0x/month  Appetite suppressant - ran out of venlafaxine one week ago and phentermine  Assessment  - Worsened; will not resume venlafaxine b/c of lack of appetite suppression (wt gain increased this last period); will start next three month course of phentermine; resume prior diet plan  Plan   -   Rules:  · Count every calorie every day  · Limit sweets to one day per month  · Eliminate all sugar sweetened beverages  · Limit restaurants (including fast food and food from a convenience store) to one time every two weeks    Requirements:  · Make sure protein intake is at least 60 grams per day (Consider using a protein shake - Nectar, Pure Protein, Premier, Boost Max, Ensure Max, BeneProtein and [employer] (which is lactose-free) are milk-based options; Nectar, Premier Protein Clear, IsoPure Protein Drink, and Protein 2 O are water-based options; Quest (or Cosco, which is cheaper and is ordered on [address] [facility] [address]) and the [place] [employer] 1 protein bars can also be used, but have less protein in them )  · Make sure that fiber intake is at least 22 grams per day. Do this by either eating 12 tablespoons of the original, plain Fiber One cereal every day or 4 tablespoons of Benefiber every day.  Work up to this amount slowly by starting with only one-fourth of the target amount and adding another one-fourth every one or two weeks  · Take one multivitamin every day    Targets:  · Limit calorie intake to 1600 marry/day  · Walk 30 minutes daily  · Establish 16 hours of food-free periods each day - no period can be less than 1 hours, the periods need to be the same every day for days that are the same (for example, workdays would have one set of food free periods and weekends would have another set of days), the usual sleep time should be included (the \"usual\" time you go to bed until the \"usual\" time you get up)  · Avoid eating 2 hours within bedtime    Tips:  · Do not eat outside of the dining room or the kitchen  · Do not eat while watching TV, videos, working on the computer or using a smart phone  · Never eat food out of the bag or container (unless it is a single serving bag)    Medications: Take phentermine 37.5 mg, one-half tablet twice daily as needed for appetite suppression. Take each dose 30-90 min before effect will be needed. While taking phentermine, check the Blood Pressure every morning and every evening. If the systolic BP is >694 mmHg, the diastolic BP is >81 mm/Hg or the heart rate is > 100 beats per minute, do not take phentermine that day. If the systolic BP is consistently >155 mmHg, the diastolic BP is consistently above 90 mm/Hg or the heart rate is consistently > 100 beats per minute, then stop taking phentermine altogether. If the systolic BP >077 mmHg or the diastolic BP is >859 mmHg (even if it is only once), then phentermine should be stopped altogether without proving that any of these are consistently elevated. Follow up  Return to see me in one month    Medication management: phentermine    An  electronic signature was used to authenticate this note.     --Catie Shah MD on 6/15/2022 at 7:25 AM

## 2022-07-19 ENCOUNTER — OFFICE VISIT (OUTPATIENT)
Dept: BARIATRICS/WEIGHT MGMT | Age: 57
End: 2022-07-19
Payer: COMMERCIAL

## 2022-07-19 VITALS
HEIGHT: 61 IN | WEIGHT: 173.4 LBS | BODY MASS INDEX: 32.74 KG/M2 | HEART RATE: 78 BPM | SYSTOLIC BLOOD PRESSURE: 141 MMHG | DIASTOLIC BLOOD PRESSURE: 83 MMHG | TEMPERATURE: 97 F

## 2022-07-19 DIAGNOSIS — G89.29 CHRONIC BILATERAL LOW BACK PAIN WITH BILATERAL SCIATICA: Primary | ICD-10-CM

## 2022-07-19 DIAGNOSIS — M54.42 CHRONIC BILATERAL LOW BACK PAIN WITH BILATERAL SCIATICA: Primary | ICD-10-CM

## 2022-07-19 DIAGNOSIS — M54.41 CHRONIC BILATERAL LOW BACK PAIN WITH BILATERAL SCIATICA: Primary | ICD-10-CM

## 2022-07-19 DIAGNOSIS — E66.09 CLASS 1 OBESITY DUE TO EXCESS CALORIES WITHOUT SERIOUS COMORBIDITY WITH BODY MASS INDEX (BMI) OF 32.0 TO 32.9 IN ADULT: ICD-10-CM

## 2022-07-19 PROCEDURE — G8428 CUR MEDS NOT DOCUMENT: HCPCS | Performed by: INTERNAL MEDICINE

## 2022-07-19 PROCEDURE — 99214 OFFICE O/P EST MOD 30 MIN: CPT | Performed by: INTERNAL MEDICINE

## 2022-07-19 PROCEDURE — 1036F TOBACCO NON-USER: CPT | Performed by: INTERNAL MEDICINE

## 2022-07-19 PROCEDURE — 99211 OFF/OP EST MAY X REQ PHY/QHP: CPT

## 2022-07-19 PROCEDURE — 3017F COLORECTAL CA SCREEN DOC REV: CPT | Performed by: INTERNAL MEDICINE

## 2022-07-19 PROCEDURE — G8417 CALC BMI ABV UP PARAM F/U: HCPCS | Performed by: INTERNAL MEDICINE

## 2022-07-19 RX ORDER — PHENTERMINE HYDROCHLORIDE 37.5 MG/1
TABLET ORAL
Qty: 30 TABLET | Refills: 0 | Status: SHIPPED | OUTPATIENT
Start: 2022-07-19 | End: 2022-08-17 | Stop reason: SDUPTHER

## 2022-07-19 NOTE — PATIENT INSTRUCTIONS
Rules:  Count every calorie every day  Limit sweets to one day per month  Eliminate all sugar sweetened beverages  Limit restaurants (including fast food and food from a convenience store) to one time every two weeks    Requirements:  Make sure protein intake is at least 60 grams per day (Consider using a protein shake - Nectar, Pure Protein, Premier, Boost Max, Ensure Max, BeneProtein and [employer] (which is lactose-free) are milk-based options; Nectar, Premier Protein Clear, IsoPure Protein Drink, and Protein 2 O are water-based options; Quest (or Cosco, which is cheaper and is ordered on [address] [facility] [address]) and the [place] [employer] 1 protein bars can also be used, but have less protein in them )  Make sure that fiber intake is at least 22 grams per day. Do this by either eating 12 tablespoons of the original, plain Fiber One cereal every day or 4 tablespoons of Benefiber every day.  Work up to this amount slowly by starting with only one-fourth of the target amount and adding another one-fourth every one or two weeks  When eating less than 800 marry/d, then eat a at snack options (<150 marry, >11 grams of fat): 22 almonds or cashews, 1 1/2 tablespoon of a oil-based dressing or 4 tablespoons of [place] dressing on a bed of salad greens, 1 1/2 tablespoons of peanut butter, 1 Cranberry [employer]  Take one multivitamin every day    Targets:  Limit calorie intake to 1600 marry/day  Walk 30 minutes daily  Establish 16 hours of food-free periods each day - no period can be less than 1 hours, the periods need to be the same every day for days that are the same (for example, workdays would have one set of food free periods and weekends would have another set of days), the usual sleep time should be included (the \"usual\" time you go to bed until the \"usual\" time you get up)  Avoid eating 2 hours within bedtime    Tips:  Do not eat outside of the dining room or the kitchen  Do not eat while watching TV, videos, working on the computer or using a smart phone  Never eat food out of the bag or container (unless it is a single serving bag)    Medications: Take phentermine 37.5 mg, one-half tablet twice daily as needed for appetite suppression. Take each dose 30-90 min before effect will be needed. While taking phentermine, check the Blood Pressure every morning and every evening. If the systolic BP is >040 mmHg, the diastolic BP is >41 mm/Hg or the heart rate is > 100 beats per minute, do not take phentermine that day. If the systolic BP is consistently >155 mmHg, the diastolic BP is consistently above 90 mm/Hg or the heart rate is consistently > 100 beats per minute, then stop taking phentermine altogether. If the systolic BP >828 mmHg or the diastolic BP is >110 mmHg (even if it is only once), then phentermine should be stopped altogether without proving that any of these are consistently elevated.     Follow up  Return to see me in one month    Medication management: phentermine

## 2022-07-19 NOTE — PROGRESS NOTES
CC:  Low back pain, Obesity    Background -   Last visit: 06/15/22  First visit: 01/15/20    Low back pain:  Present x 1yr, bilateral, associated with bilateral sciatica, constant, 7/10, worse with lifting heavy objects, NSAIDs do not help, excess wt may be contributing to it  Obesity: Present since 2016 BMI 32.22, Wt 170.6 lbs; HS grad wt: 105 lbs; Highest wt 180 lbs, Lowest wt 105 lbs; Pattern of wt gain: grad; Wt change past yr: fluctuates 10 lbs by home scales; Most wt lost: 10 lbs (Saxenda, eating right: Other diets: none    Initial Diet:    Number of meals per day - 2    Number of snacks per day - 2    Meal volume - 12\" plate, no seconds    Fast food/convenience store - 1x/week    Restaurants (not fast food) - 1-2x/week   Sweets - 7d/week (Tunde cups - 12-16 miniature size/day 44 marry/piece)   Chips - 0d/week   Crackers/pretzels - 1d/week (large pretzel ethel)   Nuts - 0d/week   Peanut Butter - 7d/week (on bread)   Popcorn - 0d/week   Dried fruit - 0d/week   Whole fruit - 7d/week (one piece)   Breakfast cereal - 0d/week   Granola/Protein/Energy bar - 0d/week   Sugar sweetened beverages - 16-24 oz reg soda/week  ______________________    [facility] -     Past Medical History:   Diagnosis Date    Anxiety     Depression     Headache     Hypertension     Low back pain     Obesity     RLS (restless legs syndrome)        Current Outpatient Medications   Medication Sig Dispense Refill    venlafaxine (EFFEXOR XR) 75 MG extended release capsule Take 1 capsule by mouth daily 30 capsule 2    FLUoxetine (PROZAC) 40 MG capsule TAKE 1 CAPSULE DAILY 90 capsule 0    rizatriptan (MAXALT-MLT) 10 MG disintegrating tablet place 1 tablet ON THE TONGUE UNTIL DISSOLVED  may repeat after 2 hours if needed *DO NOT EXCEED 3 TABLETS IN 1 DAY* 6 tablet 3    EPINEPHrine (EPIPEN 2-ARIAN) 0.3 MG/0.3ML SOAJ injection Use as directed 2 each 2    estradiol (ESTRACE VAGINAL) 0.1 MG/GM vaginal cream Insert 1g vaginally twice weekly.  1 Tube 2 gabapentin (NEURONTIN) 100 MG capsule Take 1 capsule by mouth 6 times daily 540 capsule 1     No current facility-administered medications for this visit. PE -  Gen : BP (!) 149/82 (Site: Left Upper Arm, Position: Sitting, Cuff Size: Large Adult)   Pulse 83   Temp 97 °F (36.1 °C) (Temporal)   Ht 5' 1\" (1.549 m)   Wt 173 lb 6.4 oz (78.7 kg)   LMP 05/09/2016 (Within Weeks)   BMI 32.76 kg/m²    WN, WD, NAD  Heart:  RRR w/o MGR, 2+ LE edema, No carotid bruits  Lung: Nml resp effort, CTA b/l  Psych: Normal mood   Full affect  Neuro: Moves all ext well  ______________________      HPI & A/P -   Problem 1  - Low back pain   HPI   - Severity past week: 10/10 (was 8/10) Worsened due to increased activity/lifting at her jobs  Assessment  - Improved  Plan   - Cont with the prescribed weight loss regimen    Problem 2  - Obesity  HPI   - Weight  Date      170.6 lbs  1/15/20      162.4 lbs  3/18/20      154.0 lbs   7/06/20      157.0 lbs   8/17/20      154.2 lbs   9/30/20      156.0 lbs 11/18/20      154.8 lbs 01/22/21 03/05/21 165 lbs Home, which is 10 lbs heavier than ours       167.0 lbs 04/22/21      165.0 lbs 06/04/21 Danny Daysi stopped due to loss of insurance coverage; started bupropion      171.4 lbs 07/15/21 Bupropion stopped b/c BP; topiramate started      170.4 lbs 08/19/21 Stopped Topiramate due to mood and hair loss;  Phentermine #1      164.2 lbs  09/30/21 Phentermine #2      161.4 lbs 11/04/21 Phentermine #3, Venlafaxine      -----  12/14/21 160.2 lbs Home Venlafaxine      -----  01/19/22 164.0 lbs Home Venlafaxine      -----  04/08/22 168.0 lbs Home Venlafaxine      169.8 lbs 06/15/22  Not taking any appetite suppressant for one week (venlafaxine no resumed b/c ineffectiveness); phentermine #1      173.4 lbs 07/19/22  taking phentermine         Total wt loss to date: + 2.8 lbs  DEN: 1700 marry/d  B/C of error in counting (ie only losing 1 lbs in one month on a 1200 marry/d diet), she needs an appetite suppressant. Did not tolerate bupropion and topiramate. Lost insurance coverage for Saxenda  Therefore phentermine started 8/19/21  Venlafaxine started 11/04/21 to cover after the end of Phentermine  Phentermine restarted 06/15/22  Update:  Calorie monitoring - not count calories, not calorie conscious  Sweets - 0d/week  Salty snacks - 1d/wk  SSBs - none  Restaurant food - 2x/month  Appetite suppressant - phentermine 37.5 mg, one-half tablet daily, appetite suppression: 9/10, side effect; insomnia  Home BP: 800 systolic, does not remember the diastolic  Assessment  - Worsened; needs to count calories (however, she said she will not do this); cont the same plan; cont phentermine, but she is aware that she must lose weight in order to receive the third prescription  Plan   -   Rules:  Count every calorie every day  Limit sweets to one day per month  Eliminate all sugar sweetened beverages  Limit restaurants (including fast food and food from a convenience store) to one time every two weeks    Requirements:  Make sure protein intake is at least 60 grams per day (Consider using a protein shake - Nectar, Pure Protein, Premier, Boost Max, Ensure Max, BeneProtein and [employer] (which is lactose-free) are milk-based options; Nectar, Premier Protein Clear, IsoPure Protein Drink, and Protein 2 O are water-based options; Quest (or Cosco, which is cheaper and is ordered on [address] [facility] [address]) and the Oh Ye 1 protein bars can also be used, but have less protein in them )  Make sure that fiber intake is at least 22 grams per day. Do this by either eating 12 tablespoons of the original, plain Fiber One cereal every day or 4 tablespoons of Benefiber every day.  Work up to this amount slowly by starting with only one-fourth of the target amount and adding another one-fourth every one or two weeks  When eating less than 800 marry/d, then eat a at snack options (<150 marry, >11 grams of fat): 22 almonds or cashews, 1 1/2 tablespoon of a oil-based dressing or 4 tablespoons of [place] dressing on a bed of salad greens, 1 1/2 tablespoons of peanut butter, 1 Cranberry [place] Kind Bar  Take one multivitamin every day    Targets:  Limit calorie intake to 1600 marry/day  Walk 30 minutes daily  Establish 16 hours of food-free periods each day - no period can be less than 1 hours, the periods need to be the same every day for days that are the same (for example, workdays would have one set of food free periods and weekends would have another set of days), the usual sleep time should be included (the \"usual\" time you go to bed until the \"usual\" time you get up)  Avoid eating 2 hours within bedtime    Tips:  Do not eat outside of the dining room or the kitchen  Do not eat while watching TV, videos, working on the computer or using a smart phone  Never eat food out of the bag or container (unless it is a single serving bag)    Medications: Take phentermine 37.5 mg, one-half tablet twice daily as needed for appetite suppression. Take each dose 30-90 min before effect will be needed. While taking phentermine, check the Blood Pressure every morning and every evening. If the systolic BP is >757 mmHg, the diastolic BP is >64 mm/Hg or the heart rate is > 100 beats per minute, do not take phentermine that day. If the systolic BP is consistently >155 mmHg, the diastolic BP is consistently above 90 mm/Hg or the heart rate is consistently > 100 beats per minute, then stop taking phentermine altogether. If the systolic BP >685 mmHg or the diastolic BP is >775 mmHg (even if it is only once), then phentermine should be stopped altogether without proving that any of these are consistently elevated. Follow up  Return to see me in one month    Medication management: phentermine    An  electronic signature was used to authenticate this note.     --Katlin Turner MD on 7/19/2022 at 7:56 AM

## 2022-08-17 ENCOUNTER — OFFICE VISIT (OUTPATIENT)
Dept: BARIATRICS/WEIGHT MGMT | Age: 57
End: 2022-08-17
Payer: COMMERCIAL

## 2022-08-17 VITALS
WEIGHT: 170.4 LBS | DIASTOLIC BLOOD PRESSURE: 83 MMHG | BODY MASS INDEX: 32.17 KG/M2 | TEMPERATURE: 96.8 F | HEART RATE: 79 BPM | HEIGHT: 61 IN | SYSTOLIC BLOOD PRESSURE: 143 MMHG

## 2022-08-17 DIAGNOSIS — M54.41 CHRONIC BILATERAL LOW BACK PAIN WITH BILATERAL SCIATICA: Primary | ICD-10-CM

## 2022-08-17 DIAGNOSIS — G89.29 CHRONIC BILATERAL LOW BACK PAIN WITH BILATERAL SCIATICA: Primary | ICD-10-CM

## 2022-08-17 DIAGNOSIS — E66.09 CLASS 1 OBESITY DUE TO EXCESS CALORIES WITHOUT SERIOUS COMORBIDITY WITH BODY MASS INDEX (BMI) OF 32.0 TO 32.9 IN ADULT: ICD-10-CM

## 2022-08-17 DIAGNOSIS — M54.42 CHRONIC BILATERAL LOW BACK PAIN WITH BILATERAL SCIATICA: Primary | ICD-10-CM

## 2022-08-17 PROCEDURE — 99211 OFF/OP EST MAY X REQ PHY/QHP: CPT

## 2022-08-17 PROCEDURE — G8428 CUR MEDS NOT DOCUMENT: HCPCS | Performed by: INTERNAL MEDICINE

## 2022-08-17 PROCEDURE — 99214 OFFICE O/P EST MOD 30 MIN: CPT | Performed by: INTERNAL MEDICINE

## 2022-08-17 PROCEDURE — G8417 CALC BMI ABV UP PARAM F/U: HCPCS | Performed by: INTERNAL MEDICINE

## 2022-08-17 PROCEDURE — 1036F TOBACCO NON-USER: CPT | Performed by: INTERNAL MEDICINE

## 2022-08-17 PROCEDURE — 3017F COLORECTAL CA SCREEN DOC REV: CPT | Performed by: INTERNAL MEDICINE

## 2022-08-17 RX ORDER — PHENTERMINE HYDROCHLORIDE 37.5 MG/1
TABLET ORAL
Qty: 30 TABLET | Refills: 0 | Status: SHIPPED | OUTPATIENT
Start: 2022-08-17 | End: 2022-09-16

## 2022-08-17 NOTE — PATIENT INSTRUCTIONS
Rules:  Count every calorie every day  Limit sweets to one day per month  Eliminate all sugar sweetened beverages  Limit restaurants (including fast food and food from a convenience store) to one time every two weeks    Requirements:  Make sure protein intake is at least 60 grams per day (Consider using a protein shake - Nectar, Pure Protein, Premier, Boost Max, Ensure Max, BeneProtein and [employer] (which is lactose-free) are milk-based options; Nectar, Premier Protein Clear, IsoPure Protein Drink, and Protein 2 O are water-based options; Quest (or Cosco, which is cheaper and is ordered on [place]) and the DAQRI 1 protein bars can also be used, but have less protein in them )  Make sure that fiber intake is at least 22 grams per day. Do this by either eating 12 tablespoons of the original, plain Fiber One cereal every day or 4 tablespoons of Benefiber every day.  Work up to this amount slowly by starting with only one-fourth of the target amount and adding another one-fourth every one or two weeks  When eating less than 800 marry/d, then eat a at snack options (<150 marry, >11 grams of fat): 22 almonds or cashews, 1 1/2 tablespoon of a oil-based dressing or 4 tablespoons of [place] dressing on a bed of salad greens, 1 1/2 tablespoons of peanut butter, 1 Cranberry [place] Kind Bar  Take one multivitamin every day    Targets:  Limit calorie intake to 1600 marry/day  Walk 30 minutes daily  Establish 16 hours of food-free periods each day - no period can be less than 1 hours, the periods need to be the same every day for days that are the same (for example, workdays would have one set of food free periods and weekends would have another set of days), the usual sleep time should be included (the \"usual\" time you go to bed until the \"usual\" time you get up)  Avoid eating 2 hours within bedtime    Tips:  Do not eat outside of the dining room or the kitchen  Do not eat while watching TV, videos, working on the computer or using a smart phone  Never eat food out of the bag or container (unless it is a single serving bag)    Medications: Take phentermine 37.5 mg, one-half tablet twice daily as needed for appetite suppression. Take each dose 30-90 min before effect will be needed. While taking phentermine, check the Blood Pressure every morning and every evening. If the systolic BP is >656 mmHg, the diastolic BP is >43 mm/Hg or the heart rate is > 100 beats per minute, do not take phentermine that day. If the systolic BP is consistently >155 mmHg, the diastolic BP is consistently above 90 mm/Hg or the heart rate is consistently > 100 beats per minute, then stop taking phentermine altogether. If the systolic BP >947 mmHg or the diastolic BP is >956 mmHg (even if it is only once), then phentermine should be stopped altogether without proving that any of these are consistently elevated.     Follow up  Return to see me in 5-7 weeks

## 2022-08-17 NOTE — PROGRESS NOTES
CC:  Low back pain, Obesity    Background -   Last visit: 07/19/22  First visit: 01/15/20    Low back pain:  Present x 1yr, bilateral, associated with bilateral sciatica, constant, 7/10, worse with lifting heavy objects, NSAIDs do not help, excess wt may be contributing to it  Obesity: Present since 2016 BMI 32.22, Wt 170.6 lbs; HS grad wt: 105 lbs; Highest wt 180 lbs, Lowest wt 105 lbs; Pattern of wt gain: grad; Wt change past yr: fluctuates 10 lbs by home scales; Most wt lost: 10 lbs (Saxenda, eating right: Other diets: none    Initial Diet:    Number of meals per day - 2    Number of snacks per day - 2    Meal volume - 12\" plate, no seconds    Fast food/convenience store - 1x/week    Restaurants (not fast food) - 1-2x/week   Sweets - 7d/week (Tunde cups - 12-16 miniature size/day 44 marry/piece)   Chips - 0d/week   Crackers/pretzels - 1d/week (large pretzel ethel)   Nuts - 0d/week   Peanut Butter - 7d/week (on bread)   Popcorn - 0d/week   Dried fruit - 0d/week   Whole fruit - 7d/week (one piece)   Breakfast cereal - 0d/week   Granola/Protein/Energy bar - 0d/week   Sugar sweetened beverages - 16-24 oz reg soda/week  ______________________    [facility] -     Past Medical History:   Diagnosis Date    Anxiety     Depression     Headache     Hypertension     Low back pain     Obesity     RLS (restless legs syndrome)        Current Outpatient Medications   Medication Sig Dispense Refill    venlafaxine (EFFEXOR XR) 75 MG extended release capsule Take 1 capsule by mouth daily 30 capsule 2    FLUoxetine (PROZAC) 40 MG capsule TAKE 1 CAPSULE DAILY 90 capsule 0    rizatriptan (MAXALT-MLT) 10 MG disintegrating tablet place 1 tablet ON THE TONGUE UNTIL DISSOLVED  may repeat after 2 hours if needed *DO NOT EXCEED 3 TABLETS IN 1 DAY* 6 tablet 3    EPINEPHrine (EPIPEN 2-ARIAN) 0.3 MG/0.3ML SOAJ injection Use as directed 2 each 2    estradiol (ESTRACE VAGINAL) 0.1 MG/GM vaginal cream Insert 1g vaginally twice weekly.  1 Tube 2 gabapentin (NEURONTIN) 100 MG capsule Take 1 capsule by mouth 6 times daily 540 capsule 1     No current facility-administered medications for this visit. PE -  Gen : BP (!) 143/83 (Site: Left Upper Arm, Position: Sitting, Cuff Size: Large Adult)   Pulse 79   Temp 96.8 °F (36 °C) (Temporal)   Ht 5' 1\" (1.549 m)   Wt 170 lb 6.4 oz (77.3 kg)   LMP 05/09/2016 (Within Weeks)   BMI 32.20 kg/m²    WN, WD, NAD  Heart:  RRR w/o MGR, 2+ LE edema, No carotid bruits  Lung: Nml resp effort, CTA b/l  Psych: Normal mood   Full affect  Neuro: Moves all ext well  ______________________      HPI & A/P -   Problem 1  - Low back pain   HPI   - Severity past week: 4/10 (was 10/10); states it has improved with stretching exercises, but her work hours at [employer] has also decreased  Assessment  - Improved  Plan   - Cont with the prescribed weight loss regimen    Problem 2  - Obesity  HPI   - Weight  Date      170.6 lbs  1/15/20      162.4 lbs  3/18/20      154.0 lbs   7/06/20      157.0 lbs   8/17/20      154.2 lbs   9/30/20      156.0 lbs 11/18/20      154.8 lbs 01/22/21 03/05/21 165 lbs Home, which is 10 lbs heavier than ours       167.0 lbs 04/22/21      165.0 lbs 06/04/21 Octavio Vaughn stopped due to loss of insurance coverage; started bupropion      171.4 lbs 07/15/21 Bupropion stopped b/c BP; topiramate started      170.4 lbs 08/19/21 Stopped Topiramate due to mood and hair loss;  Phentermine #1      164.2 lbs  09/30/21 Phentermine #2      161.4 lbs 11/04/21 Phentermine #3, Venlafaxine      -----  12/14/21 160.2 lbs Home Venlafaxine      -----  01/19/22 164.0 lbs Home Venlafaxine      -----  04/08/22 168.0 lbs Home Venlafaxine      169.8 lbs 06/15/22  Not taking any appetite suppressant for one week (venlafaxine no resumed b/c ineffectiveness); phentermine #1      173.4 lbs 07/19/22  Phentermine #2       170.4 lbs 08/17/22  taking phentermine        Total wt loss to date: - 0.2 lbs  DEN: 1700 marry/d  B/C of error in counting (ie only losing 1 lbs in one month on a 1200 marry/d diet), she needs an appetite suppressant. Did not tolerate bupropion (elevated BP) and topiramate (mood, difficulty with waking up, alopecia). Contrave ineffective Lost insurance coverage for [employer]  Therefore phentermine started 8/19/21  Venlafaxine started 11/04/21 to cover after the end of Phentermine; it was ineffective  Phentermine restarted 06/15/22  Update:  Calorie monitoring - counting 0d/wk, not calorie conscious, reduces portion sizes instead  Sweets - 1d/month  Salty snacks - 1-2d/mo  SSBs - none  Restaurant food - 2x/month  Appetite suppressant - phentermine 37.5 mg, one-half tablet daily, appetite suppression: 8-9/10, side effect - none   Home BP: 160's/diastolic; check every day  Assessment  - Improved; needs to count calories (lost weight at the beginning in 2020 while doing so); cont the same plan; if not counting, then increasing the phentermine may help; must check BP every day and follow the parameters  Plan   -   Rules:  Count every calorie every day  Limit sweets to one day per month  Eliminate all sugar sweetened beverages  Limit restaurants (including fast food and food from a convenience store) to one time every two weeks    Requirements:  Make sure protein intake is at least 60 grams per day (Consider using a protein shake - Nectar, Pure Protein, Premier, Boost Max, Ensure Max, BeneProtein and [employer] (which is lactose-free) are milk-based options; Nectar, Premier Protein Clear, IsoPure Protein Drink, and Protein 2 O are water-based options; Quest (or Cosco, which is cheaper and is ordered on [address] [facility] [address]) and the Oh Ye 1 protein bars can also be used, but have less protein in them )  Make sure that fiber intake is at least 22 grams per day. Do this by either eating 12 tablespoons of the original, plain Fiber One cereal every day or 4 tablespoons of Benefiber every day.  Work up to this amount slowly by starting with only one-fourth of the target amount and adding another one-fourth every one or two weeks  When eating less than 800 marry/d, then eat a at snack options (<150 marry, >11 grams of fat): 22 almonds or cashews, 1 1/2 tablespoon of a oil-based dressing or 4 tablespoons of [place] dressing on a bed of salad greens, 1 1/2 tablespoons of peanut butter, 1 Cranberry [place] Kind Bar  Take one multivitamin every day    Targets:  Limit calorie intake to 1600 marry/day  Walk 30 minutes daily  Establish 16 hours of food-free periods each day - no period can be less than 1 hours, the periods need to be the same every day for days that are the same (for example, workdays would have one set of food free periods and weekends would have another set of days), the usual sleep time should be included (the \"usual\" time you go to bed until the \"usual\" time you get up)  Avoid eating 2 hours within bedtime    Tips:  Do not eat outside of the dining room or the kitchen  Do not eat while watching TV, videos, working on the computer or using a smart phone  Never eat food out of the bag or container (unless it is a single serving bag)    Medications: Take phentermine 37.5 mg, one-half tablet twice daily as needed for appetite suppression. Take each dose 30-90 min before effect will be needed. While taking phentermine, check the Blood Pressure every morning and every evening. If the systolic BP is >982 mmHg, the diastolic BP is >56 mm/Hg or the heart rate is > 100 beats per minute, do not take phentermine that day. If the systolic BP is consistently >155 mmHg, the diastolic BP is consistently above 90 mm/Hg or the heart rate is consistently > 100 beats per minute, then stop taking phentermine altogether. If the systolic BP >067 mmHg or the diastolic BP is >766 mmHg (even if it is only once), then phentermine should be stopped altogether without proving that any of these are consistently elevated.     Follow up  Return to see me in 5-7 weeks    Medication management: phentermine    An  electronic signature was used to authenticate this note.     --Natasha Solares MD on 8/17/2022 at 7:39 AM

## 2023-03-15 ENCOUNTER — APPOINTMENT (OUTPATIENT)
Dept: ULTRASOUND IMAGING | Age: 58
End: 2023-03-15
Payer: COMMERCIAL

## 2023-03-15 ENCOUNTER — HOSPITAL ENCOUNTER (EMERGENCY)
Age: 58
Discharge: HOME | End: 2023-03-15
Attending: EMERGENCY MEDICINE
Payer: COMMERCIAL

## 2023-03-15 ENCOUNTER — APPOINTMENT (OUTPATIENT)
Dept: GENERAL RADIOLOGY | Age: 58
End: 2023-03-15
Payer: COMMERCIAL

## 2023-03-15 VITALS
TEMPERATURE: 97.4 F | DIASTOLIC BLOOD PRESSURE: 80 MMHG | HEART RATE: 80 BPM | OXYGEN SATURATION: 96 % | SYSTOLIC BLOOD PRESSURE: 158 MMHG | RESPIRATION RATE: 16 BRPM

## 2023-03-15 DIAGNOSIS — R60.9 PERIPHERAL EDEMA: Primary | ICD-10-CM

## 2023-03-15 LAB
ALBUMIN SERPL-MCNC: 4 G/DL (ref 3.5–5.2)
ALP SERPL-CCNC: 96 U/L (ref 35–104)
ALT SERPL-CCNC: 119 U/L (ref 0–32)
ANION GAP SERPL CALCULATED.3IONS-SCNC: 9 MMOL/L (ref 7–16)
AST SERPL-CCNC: 44 U/L (ref 0–31)
BASOPHILS # BLD: 0.07 E9/L (ref 0–0.2)
BASOPHILS NFR BLD: 1.2 % (ref 0–2)
BILIRUB SERPL-MCNC: 0.3 MG/DL (ref 0–1.2)
BNP BLD-MCNC: 10 PG/ML (ref 0–125)
BUN SERPL-MCNC: 14 MG/DL (ref 6–20)
CALCIUM SERPL-MCNC: 9.4 MG/DL (ref 8.6–10.2)
CHLORIDE SERPL-SCNC: 107 MMOL/L (ref 98–107)
CO2 SERPL-SCNC: 27 MMOL/L (ref 22–29)
CREAT SERPL-MCNC: 0.6 MG/DL (ref 0.5–1)
EOSINOPHIL # BLD: 0.15 E9/L (ref 0.05–0.5)
EOSINOPHIL NFR BLD: 2.6 % (ref 0–6)
ERYTHROCYTE [DISTWIDTH] IN BLOOD BY AUTOMATED COUNT: 12.7 FL (ref 11.5–15)
GLUCOSE SERPL-MCNC: 94 MG/DL (ref 74–99)
HCT VFR BLD AUTO: 39.5 % (ref 34–48)
HGB BLD-MCNC: 13.2 G/DL (ref 11.5–15.5)
IMM GRANULOCYTES # BLD: 0.02 E9/L
IMM GRANULOCYTES NFR BLD: 0.3 % (ref 0–5)
LYMPHOCYTES # BLD: 1.77 E9/L (ref 1.5–4)
LYMPHOCYTES NFR BLD: 30.3 % (ref 20–42)
MCH RBC QN AUTO: 31.5 PG (ref 26–35)
MCHC RBC AUTO-ENTMCNC: 33.4 % (ref 32–34.5)
MCV RBC AUTO: 94.3 FL (ref 80–99.9)
MONOCYTES # BLD: 0.68 E9/L (ref 0.1–0.95)
MONOCYTES NFR BLD: 11.6 % (ref 2–12)
NEUTROPHILS # BLD: 3.16 E9/L (ref 1.8–7.3)
NEUTS SEG NFR BLD: 54 % (ref 43–80)
PLATELET # BLD AUTO: 255 E9/L (ref 130–450)
PMV BLD AUTO: 9.4 FL (ref 7–12)
POTASSIUM SERPL-SCNC: 3.9 MMOL/L (ref 3.5–5)
PROT SERPL-MCNC: 6.8 G/DL (ref 6.4–8.3)
RBC # BLD AUTO: 4.19 E12/L (ref 3.5–5.5)
SODIUM SERPL-SCNC: 143 MMOL/L (ref 132–146)
TROPONIN, HIGH SENSITIVITY: 7 NG/L (ref 0–9)
TROPONIN, HIGH SENSITIVITY: 8 NG/L (ref 0–9)
WBC # BLD: 5.9 E9/L (ref 4.5–11.5)

## 2023-03-15 PROCEDURE — 71046 X-RAY EXAM CHEST 2 VIEWS: CPT

## 2023-03-15 PROCEDURE — 93005 ELECTROCARDIOGRAM TRACING: CPT | Performed by: NURSE PRACTITIONER

## 2023-03-15 PROCEDURE — 36415 COLL VENOUS BLD VENIPUNCTURE: CPT

## 2023-03-15 PROCEDURE — 80053 COMPREHEN METABOLIC PANEL: CPT

## 2023-03-15 PROCEDURE — 84484 ASSAY OF TROPONIN QUANT: CPT

## 2023-03-15 PROCEDURE — 83880 ASSAY OF NATRIURETIC PEPTIDE: CPT

## 2023-03-15 PROCEDURE — 93970 EXTREMITY STUDY: CPT

## 2023-03-15 PROCEDURE — 99285 EMERGENCY DEPT VISIT HI MDM: CPT

## 2023-03-15 PROCEDURE — 85025 COMPLETE CBC W/AUTO DIFF WBC: CPT

## 2023-03-15 NOTE — ED NOTES
Department of Emergency Medicine  FIRST PROVIDER TRIAGE NOTE             Independent MLP           3/15/23  3:08 PM EDT    Date of Encounter: 3/15/23   MRN: [id number]      HPI: Stas Hogue is a 65 y.o. female who presents to the ED for No chief complaint on file. Patient presents with complaint of chest pain and bilateral leg pain. Pain is in bilateral calf. Left worse than right. She states she also has chest pain when she bends forward. Recent surgery on 1/27    ROS: Negative for sob, abd pain, back pain, fever, cough, vomiting, or diarrhea. PE: Gen Appearance/Constitutional: alert  HEENT: NC/NT. PERRLA,  Airway patent. Initial Plan of Care: All treatment areas with department are currently occupied. Plan to order/Initiate the following while awaiting opening in ED: labs, EKG, and imaging studies.   Initiate Treatment-Testing, Proceed toTreatment Area When Bed Available for ED Attending/MLP to Continue Care    Electronically signed by YUNIOR Coley CNP   DD: 3/15/23      YUNIOR Coley CNP  03/15/23 [id number]

## 2023-03-15 NOTE — ED PROVIDER NOTES
ED Physician   HPI:  3/15/23, Time: 7:35 PM EDT         Ness Koo is a 65 y.o. female presenting to the ED for leg pain, beginning days ago. The complaint has been constant, mild in severity, and worsened by standing, walking, bending. Patient states she did have episode of chest pain while bending over, initially reported that it was just 1 day ago. And states that she has noticed it with movement. .  Chest pain is reproducible when patient bends. No tenderness upon palpation to chest.  Patient states she has not had chest pain since arriving to the ED. Patient denies shortness of breath, palpitations, abdominal pain. Recent abdominal surgery on January 27 and has been progressing very well. Pain to surgical site no evidence of infection at this time. Patient denies any recent travel. Patient has full range of motion in upper and lower extremities. Patient has 2+ dorsalis pedis pulses. Patient otherwise denies any back or flank pain. No unusual rash no unusual paresthesia, numbness or tingling to her lower extremities. Review of Systems:   A complete review of systems was performed and pertinent positives and negatives are stated within HPI, all other systems reviewed and are negative.          --------------------------------------------- PAST HISTORY ---------------------------------------------  Past Medical History:  has a past medical history of Anxiety, Depression, Headache, Hypertension, Low back pain, Obesity, and RLS (restless legs syndrome). Past Surgical History:  has no past surgical history on file. Social History:  reports that she has quit smoking. She has never used smokeless tobacco. She reports current alcohol use. Family History: family history is not on file. The patients home medications have been reviewed.     Allergies: Asa [aspirin] and Other    -------------------------------------------------- RESULTS -------------------------------------------------  All laboratory and radiology results have been personally reviewed by myself   LABS:  Results for orders placed or performed during the hospital encounter of 03/15/23   CBC with Auto Differential   Result Value Ref Range    WBC 5.9 4.5 - 11.5 E9/L    RBC 4.19 3.50 - 5.50 E12/L    Hemoglobin 13.2 11.5 - 15.5 g/dL    Hematocrit 39.5 34.0 - 48.0 %    MCV 94.3 80.0 - 99.9 fL    MCH 31.5 26.0 - 35.0 pg    MCHC 33.4 32.0 - 34.5 %    RDW 12.7 11.5 - 15.0 fL    Platelets 333 472 - 229 E9/L    MPV 9.4 7.0 - 12.0 fL    Neutrophils % 54.0 43.0 - 80.0 %    Immature Granulocytes % 0.3 0.0 - 5.0 %    Lymphocytes % 30.3 20.0 - 42.0 %    Monocytes % 11.6 2.0 - 12.0 %    Eosinophils % 2.6 0.0 - 6.0 %    Basophils % 1.2 0.0 - 2.0 %    Neutrophils Absolute 3.16 1.80 - 7.30 E9/L    Immature Granulocytes # 0.02 E9/L    Lymphocytes Absolute 1.77 1.50 - 4.00 E9/L    Monocytes Absolute 0.68 0.10 - 0.95 E9/L    Eosinophils Absolute 0.15 0.05 - 0.50 E9/L    Basophils Absolute 0.07 0.00 - 0.20 E9/L   Comprehensive Metabolic Panel w/ Reflex to MG   Result Value Ref Range    Sodium 143 132 - 146 mmol/L    Potassium reflex Magnesium 3.9 3.5 - 5.0 mmol/L    Chloride 107 98 - 107 mmol/L    CO2 27 22 - 29 mmol/L    Anion Gap 9 7 - 16 mmol/L    Glucose 94 74 - 99 mg/dL    BUN 14 6 - 20 mg/dL    Creatinine 0.6 0.5 - 1.0 mg/dL    Est, Glom Filt Rate >60 >=60 mL/min/1.73    Calcium 9.4 8.6 - 10.2 mg/dL    Total Protein 6.8 6.4 - 8.3 g/dL    Albumin 4.0 3.5 - 5.2 g/dL    Total Bilirubin 0.3 0.0 - 1.2 mg/dL    Alkaline Phosphatase 96 35 - 104 U/L     (H) 0 - 32 U/L    AST 44 (H) 0 - 31 U/L   Troponin   Result Value Ref Range    Troponin, High Sensitivity 8 0 - 9 ng/L   Troponin   Result Value Ref Range    Troponin, High Sensitivity 7 0 - 9 ng/L   Brain Natriuretic Peptide   Result Value Ref Range    Pro-BNP 10 0 - 125 pg/mL   EKG 12 Lead   Result Value Ref Range    Ventricular Rate 75 BPM    Atrial Rate 75 BPM    P-R Interval 142 ms    QRS Duration 68 ms    Q-T Interval 390 ms    QTc Calculation (Bazett) 435 ms    P Axis 75 degrees    R Axis 73 degrees    T Axis 44 degrees       RADIOLOGY:  Interpreted by RadiologistRoxanne Auguste DUP LOWER EXTREMITIES BILATERAL VENOUS   Final Result   No evidence of DVT in either lower extremity. XR CHEST (2 VW)   Final Result   No acute process. ------------------------- NURSING NOTES AND VITALS REVIEWED ---------------------------   The nursing notes within the ED encounter and vital signs as below have been reviewed. BP (!) 158/80   Pulse 80   Temp 97.4 °F (36.3 °C) (Temporal)   Resp 16   LMP 05/09/2016 (Within Weeks)   SpO2 96%   Oxygen Saturation Interpretation: Normal      ---------------------------------------------------PHYSICAL EXAM--------------------------------------      Constitutional/General: Alert and oriented x3, mildly uncomfortable  Head: Normocephalic and atraumatic  Eyes: PERRL, EOMI  Mouth: Oropharynx clear, handling secretions, no trismus  Neck: Supple, full ROM,   Pulmonary: Lungs clear to auscultation bilaterally, no wheezes, rales, or rhonchi. Not in respiratory distress, pain is reproducible across the anterior chest wall  Cardiovascular:  Regular rate and rhythm, no murmurs, gallops, or rubs. 2+ distal pulses. Abdomen: Soft, non tender, non distended,   Extremities: Moves all extremities x 4. Warm and well perfused.,  Bilateral lower extremities with generalized peripheral edema. 2+ dorsal pedal pulses. Compartments soft, full sensations intact. Skin: warm and dry without rash  Neurologic: GCS 15,  Psych: Normal Affect      ------------------------------ ED COURSE/MEDICAL DECISION MAKING----------------------  Medications - No data to display      ED COURSE:       Medical Decision Making: Carrie Prieto is a 65 y.o. female presenting to the ED for leg pain, beginning days ago. The complaint has been constant, mild in severity, and worsened by standing, walking, bending. Patient states she did have episode of chest pain while bending over, initially reported that it was just 1 day ago. And states that she has noticed it with movement. .  Chest pain is reproducible when patient bends. No tenderness upon palpation to chest.  Patient states she has not had chest pain since arriving to the ED. Patient denies shortness of breath, palpitations, abdominal pain. Recent abdominal surgery on January 27 and has been progressing very well. Pain to surgical site no evidence of infection at this time. Patient denies any recent travel. Patient has full range of motion in upper and lower extremities. Patient has 2+ dorsalis pedis pulses. Patient otherwise denies any back or flank pain. No unusual rash no unusual paresthesia, numbness or tingling to her lower extremities. Differential includes acute coronary syndrome versus DVT versus congestive heart failure. Plan will be for labs will also obtain imaging. Labs resulted troponin negative, repeat troponin also negative, CBC unremarkable, chemistry panel all within normal limits, proBNP negative. Ultrasound of lower extremity showing no evidence of any DVT. Chest x-ray no acute process. Twelve-lead EKG interpreted by the emergency room attending as well as myself showing a heart rate of 75, normal sinus rhythm. No acute ST elevation or depression noted. Normal axis deviation. Patient was made aware of all results. Patient was upset due to long wait. We did provide her with apology. She was placed in chairs as she was assessed as well as waited for the repeat troponin. Patient was made aware of all results. Patient with heart score of 2. She was made aware of good follow-up with her primary care doctor as well as strict return precautions. Patient without any concern for DVT as well as no noted heart failure. She was informed to follow-up with her primary care doctor. Patient expressed full understanding.   Patient safely discharged home. History from : Patient and Medical records     Limitations to history : None    Chronic Conditions: has a past medical history of Anxiety, Depression, Headache, Hypertension, Low back pain, Obesity, and RLS (restless legs syndrome). CONSULTS: Follow-up with primary care doctor. Discussion with Other Profesionals : None    Social Determinants : None    Records Reviewed : Source patient and Inpatient Notes epic medical records        Disposition Considerations (Tests not ordered but considered, Shared Decision Making, Pt Expectation of Test or Tx.):   Appropriate for outpatient management yes and Evaluation by myself and discharge recommended. I am the Primary Clinician of Record. Counseling: The emergency provider has spoken with the patient and spouse/SO and discussed todays results, in addition to providing specific details for the plan of care and counseling regarding the diagnosis and prognosis. Questions are answered at this time and they are agreeable with the plan.      --------------------------------- IMPRESSION AND DISPOSITION ---------------------------------    IMPRESSION  1. Peripheral edema        DISPOSITION  Disposition: Discharge to home  Patient condition is stable      NOTE: This report was transcribed using voice recognition software. Every effort was made to ensure accuracy; however, inadvertent computerized transcription errors may be present      YUNIOR Zhang CNP  03/15/23 2937  ATTENDING PROVIDER ATTESTATION:     I have personally performed and/or participated in the history, exam, medical decision making, and procedures and agree with all pertinent clinical information. I have also reviewed and agree with the past medical, family and social history unless otherwise noted. My findings/Plan: I was the primary provider for patient.   Patient with history of depression headache history of hypertension history of back pain presenting here because of edema to her lower extremities. Patient reporting no shortness of breath she reports an episode of chest pain lasting seconds while at home yesterday. But having no pain today. Patient reporting no fever no chills she reports no pleuritic pain she reports no abdominal pain or vomiting there is no diarrhea. Patient reporting no active headache. Patient is awake alert oriented x3 she does report having pain with ambulation pain in her legs. Patient heart exam is normal lungs are clear abdomen is soft nontender she has had prior history of surgery. There is noted scar from lower abdomen. Patient reports history of abdominal plasty. Patient has noted edema bilaterally 2-3+ pulses are intact distally. Patient differential includes DVT as well as PE as well as CHF. patient was seen here June in 2020 for anaphylaxis. Patient does not smoke  Patient had EKG done in the waiting room I did review the EKG myself I do agree with interpretation by nurse practitioner heart rate was 75 there is no ST elevation. Patient labs showed a white count that was normal hemoglobin was 13.2 electrolytes of sodium was 143 potassium 3.9 patient's troponins were nonelevated there was a proBNP done that was 10 ultrasounds of the lower extremity showed no DVT chest x-ray showed no infiltrate or effusion or signs of heart failure. Patient's vital signs were stable here in the emergency department. Patient was made aware of results and findings and plan. Patient will be discharged home. Patient comfortable with plan.      Luda Pruitt MD  03/16/23 [id number]

## 2023-03-16 LAB
EKG ATRIAL RATE: 75 BPM
EKG P AXIS: 75 DEGREES
EKG P-R INTERVAL: 142 MS
EKG Q-T INTERVAL: 390 MS
EKG QRS DURATION: 68 MS
EKG QTC CALCULATION (BAZETT): 435 MS
EKG R AXIS: 73 DEGREES
EKG T AXIS: 44 DEGREES
EKG VENTRICULAR RATE: 75 BPM

## 2023-03-16 PROCEDURE — 93010 ELECTROCARDIOGRAM REPORT: CPT | Performed by: INTERNAL MEDICINE

## 2025-03-28 ENCOUNTER — APPOINTMENT (OUTPATIENT)
Dept: ULTRASOUND IMAGING | Age: 60
End: 2025-03-28
Payer: COMMERCIAL

## 2025-03-28 ENCOUNTER — HOSPITAL ENCOUNTER (EMERGENCY)
Age: 60
Discharge: HOME | End: 2025-03-28
Attending: EMERGENCY MEDICINE
Payer: COMMERCIAL

## 2025-03-28 VITALS
RESPIRATION RATE: 16 BRPM | HEART RATE: 68 BPM | OXYGEN SATURATION: 100 % | BODY MASS INDEX: 36.58 KG/M2 | SYSTOLIC BLOOD PRESSURE: 165 MMHG | TEMPERATURE: 97.7 F | WEIGHT: 200 LBS | DIASTOLIC BLOOD PRESSURE: 86 MMHG

## 2025-03-28 DIAGNOSIS — N93.9 ABNORMAL VAGINAL BLEEDING: Primary | ICD-10-CM

## 2025-03-28 LAB
ABO + RH BLD: NORMAL
ALBUMIN SERPL-MCNC: 4.1 G/DL (ref 3.5–5.2)
ALP SERPL-CCNC: 94 U/L (ref 35–104)
ALT SERPL-CCNC: 16 U/L (ref 0–32)
ANION GAP SERPL CALCULATED.3IONS-SCNC: 12 MMOL/L (ref 7–16)
ARM BAND NUMBER: NORMAL
AST SERPL-CCNC: 17 U/L (ref 0–31)
BASOPHILS # BLD: 0.03 K/UL (ref 0–0.2)
BASOPHILS NFR BLD: 1 % (ref 0–2)
BILIRUB SERPL-MCNC: 0.6 MG/DL (ref 0–1.2)
BILIRUB UR QL STRIP: NEGATIVE
BLOOD BANK SAMPLE EXPIRATION: NORMAL
BLOOD GROUP ANTIBODIES SERPL: NEGATIVE
BUN SERPL-MCNC: 14 MG/DL (ref 6–20)
CALCIUM SERPL-MCNC: 9.1 MG/DL (ref 8.6–10.2)
CHLORIDE SERPL-SCNC: 103 MMOL/L (ref 98–107)
CLARITY UR: CLEAR
CO2 SERPL-SCNC: 23 MMOL/L (ref 22–29)
COLOR UR: YELLOW
CREAT SERPL-MCNC: 0.8 MG/DL (ref 0.5–1)
EOSINOPHIL # BLD: 0.1 K/UL (ref 0.05–0.5)
EOSINOPHILS RELATIVE PERCENT: 2 % (ref 0–6)
ERYTHROCYTE [DISTWIDTH] IN BLOOD BY AUTOMATED COUNT: 13 % (ref 11.5–15)
GFR, ESTIMATED: >90 ML/MIN/1.73M2
GLUCOSE SERPL-MCNC: 94 MG/DL (ref 74–99)
GLUCOSE UR STRIP-MCNC: NEGATIVE MG/DL
HCG SERPL QL: NEGATIVE
HCT VFR BLD AUTO: 43.9 % (ref 34–48)
HGB BLD-MCNC: 13.8 G/DL (ref 11.5–15.5)
HGB UR QL STRIP.AUTO: ABNORMAL
IMM GRANULOCYTES # BLD AUTO: 0.03 K/UL (ref 0–0.58)
IMM GRANULOCYTES NFR BLD: 1 % (ref 0–5)
INR PPP: 1.1
KETONES UR STRIP-MCNC: NEGATIVE MG/DL
LEUKOCYTE ESTERASE UR QL STRIP: NEGATIVE
LYMPHOCYTES NFR BLD: 2.04 K/UL (ref 1.5–4)
LYMPHOCYTES RELATIVE PERCENT: 31 % (ref 20–42)
MCH RBC QN AUTO: 29.1 PG (ref 26–35)
MCHC RBC AUTO-ENTMCNC: 31.4 G/DL (ref 32–34.5)
MCV RBC AUTO: 92.4 FL (ref 80–99.9)
MONOCYTES NFR BLD: 0.51 K/UL (ref 0.1–0.95)
MONOCYTES NFR BLD: 8 % (ref 2–12)
NEUTROPHILS NFR BLD: 59 % (ref 43–80)
NEUTS SEG NFR BLD: 3.9 K/UL (ref 1.8–7.3)
NITRITE UR QL STRIP: NEGATIVE
PH UR STRIP: 6 [PH] (ref 5–8)
PLATELET # BLD AUTO: 280 K/UL (ref 130–450)
PMV BLD AUTO: 9.4 FL (ref 7–12)
POTASSIUM SERPL-SCNC: 4.3 MMOL/L (ref 3.5–5)
PROT SERPL-MCNC: 7.2 G/DL (ref 6.4–8.3)
PROT UR STRIP-MCNC: NEGATIVE MG/DL
PROTHROMBIN TIME: 12.1 SEC (ref 9.3–12.4)
RBC # BLD AUTO: 4.75 M/UL (ref 3.5–5.5)
RBC #/AREA URNS HPF: ABNORMAL /HPF
SODIUM SERPL-SCNC: 138 MMOL/L (ref 132–146)
SP GR UR STRIP: 1.01 (ref 1–1.03)
UROBILINOGEN UR STRIP-ACNC: 0.2 EU/DL (ref 0–1)
WBC #/AREA URNS HPF: ABNORMAL /HPF
WBC OTHER # BLD: 6.6 K/UL (ref 4.5–11.5)

## 2025-03-28 PROCEDURE — 76856 US EXAM PELVIC COMPLETE: CPT

## 2025-03-28 PROCEDURE — 80053 COMPREHEN METABOLIC PANEL: CPT

## 2025-03-28 PROCEDURE — 99284 EMERGENCY DEPT VISIT MOD MDM: CPT

## 2025-03-28 PROCEDURE — 85610 PROTHROMBIN TIME: CPT

## 2025-03-28 PROCEDURE — 85025 COMPLETE CBC W/AUTO DIFF WBC: CPT

## 2025-03-28 PROCEDURE — 86900 BLOOD TYPING SEROLOGIC ABO: CPT

## 2025-03-28 PROCEDURE — 81001 URINALYSIS AUTO W/SCOPE: CPT

## 2025-03-28 PROCEDURE — 86850 RBC ANTIBODY SCREEN: CPT

## 2025-03-28 PROCEDURE — 84703 CHORIONIC GONADOTROPIN ASSAY: CPT

## 2025-03-28 PROCEDURE — 86901 BLOOD TYPING SEROLOGIC RH(D): CPT

## 2025-03-28 ASSESSMENT — LIFESTYLE VARIABLES
HOW OFTEN DO YOU HAVE A DRINK CONTAINING ALCOHOL: 2-4 TIMES A MONTH
HOW MANY STANDARD DRINKS CONTAINING ALCOHOL DO YOU HAVE ON A TYPICAL DAY: 1 OR 2

## 2025-03-28 NOTE — DISCHARGE INSTR - COC
Continuity of Care Form    Patient Name: Kimberly Lance   :  1965  MRN:  [id number]    Admit date:  3/28/2025  Discharge date:  ***    Code Status Order: No Order   Advance Directives:     Admitting Physician:  No admitting provider for patient encounter.  PCP: Leticia Be MD    Discharging Nurse: ***  Discharging Hospital Unit/Room#:   Discharging Unit Phone Number: ***    Emergency Contact:   Extended Emergency Contact Information  Primary Emergency Contact: Ok Lance  Address: [address] [place] [address]  Home Phone: [phone]  Mobile Phone: [phone]  Relation: Spouse    Past Surgical History:  No past surgical history on file.    Immunization History:   Immunization History   Administered Date(s) Administered    Influenza Virus Vaccine 2007    Influenza, FLUARIX, FLULAVAL, FLUZONE (age 6 mo+) and AFLURIA, (age 3 y+), Quadv PF, 0.5mL 10/05/2016       Active Problems:  Patient Active Problem List   Diagnosis Code    Hypertension I10    Chronic headaches R51.9, G89.29    Anxiety and depression F41.9, F32.A    Restless leg syndrome G25.81    Right shoulder pain M25.511    Puncture wound of foot, left S91.332A    Menopausal symptoms N95.1    Obesity (BMI 30-39.9) E66.9    Low back pain M54.50       Isolation/Infection:   Isolation            No Isolation          Patient Infection Status    None to display         Nurse Assessment:  Last Vital Signs: BP (!) 165/86   Pulse 68   Temp 97.7 °F (36.5 °C) (Temporal)   Resp 16   Wt 90.7 kg (200 lb)   LMP 2016 (Within Weeks)   SpO2 100%   BMI 36.58 kg/m²     Last documented pain score (0-10 scale):    Last Weight:   Wt Readings from Last 1 Encounters:   25 90.7 kg (200 lb)     Mental Status:  {IP PT MENTAL STATUS:69056}    IV Access:  { ZION IV ACCESS:945587052}    Nursing Mobility/ADLs:  Walking   {CHP DME ADLs:100165779}  Transfer  {CHP DME ADLs:827090614}  Bathing  {CHP

## 2025-03-28 NOTE — DISCHARGE INSTRUCTIONS
Call family doctor and specialist to schedule an appointment    Have your doctor obtain copies of all results and records and re-review them with you    Please take your papers with you to all followup appointments    If symptoms reoccur or worsen or if you believe you need emergency services for any other reason return to [facility]    US PELVIS COMPLETE   Final Result      1.  Endometrial thickness of 6.4 mm is mildly thickened in this reported   postmenopausal patient.  Mild endometrial hyperplasia is not excluded.      2.  Otherwise no acute pathology noted and no evidence of ovarian torsion.           Results for orders placed or performed during the hospital encounter of 03/28/25   CBC with Auto Differential   Result Value Ref Range    WBC 6.6 4.5 - 11.5 k/uL    RBC 4.75 3.50 - 5.50 m/uL    Hemoglobin 13.8 11.5 - 15.5 g/dL    Hematocrit 43.9 34.0 - 48.0 %    MCV 92.4 80.0 - 99.9 fL    MCH 29.1 26.0 - 35.0 pg    MCHC 31.4 (L) 32.0 - 34.5 g/dL    RDW 13.0 11.5 - 15.0 %    Platelets 280 130 - 450 k/uL    MPV 9.4 7.0 - 12.0 fL    Neutrophils % 59 43.0 - 80.0 %    Lymphocytes % 31 20.0 - 42.0 %    Monocytes % 8 2.0 - 12.0 %    Eosinophils % 2 0 - 6 %    Basophils % 1 0.0 - 2.0 %    Immature Granulocytes % 1 0.0 - 5.0 %    Neutrophils Absolute 3.90 1.80 - 7.30 k/uL    Lymphocytes Absolute 2.04 1.50 - 4.00 k/uL    Monocytes Absolute 0.51 0.10 - 0.95 k/uL    Eosinophils Absolute 0.10 0.05 - 0.50 k/uL    Basophils Absolute 0.03 0.00 - 0.20 k/uL    Immature Granulocytes Absolute 0.03 0.00 - 0.58 k/uL   CMP   Result Value Ref Range    Sodium 138 132 - 146 mmol/L    Potassium 4.3 3.5 - 5.0 mmol/L    Chloride 103 98 - 107 mmol/L    CO2 23 22 - 29 mmol/L    Anion Gap 12 7 - 16 mmol/L    Glucose 94 74 - 99 mg/dL    BUN 14 6 - 20 mg/dL    Creatinine 0.8 0.50 - 1.00 mg/dL    Est, Glom Filt Rate >90 >60 mL/min/1.73m2    Calcium 9.1 8.6 - 10.2 mg/dL    Total Protein 7.2 6.4 - 8.3 g/dL    Albumin 4.1 3.5 - 5.2 g/dL

## 2025-03-28 NOTE — ED PROVIDER NOTES
[facility] EMERGENCY DEPARTMENT  EMERGENCY DEPARTMENT ENCOUNTER        Pt Name: Kimberly Lance  MRN: [id number]  Birthdate 1965  Date of evaluation: 3/28/2025  Provider: Devonte Torres DO  PCP: Leticia Be MD  Note Started: 12:23 PM EDT 3/28/25    CHIEF COMPLAINT       Chief Complaint   Patient presents with    Vaginal Bleeding     Onset today       HISTORY OF PRESENT ILLNESS: 1 or more Elements     History from : Patient    Limitations to history : None    Kimberly Lance is a 59 y.o. female who presents for vaginal bleeding starting today. Pt has used a panty liner./ she noticed when gopig to the bathrrom. Pt does see dr ortiz. Pt recently started generic ozempic.    Nursing Notes were all reviewed and agreed with or any disagreements were addressed in the HPI.    REVIEW OF SYSTEMS :      Review of Systems   Genitourinary:  Positive for vaginal bleeding.       Positives and Pertinent negatives as per HPI.     SURGICAL HISTORY   No past surgical history on file.    CURRENTMEDICATIONS       Previous Medications    EPINEPHRINE (EPIPEN 2-ARIAN) 0.3 MG/0.3ML SOAJ INJECTION    Use as directed    ESTRADIOL (ESTRACE VAGINAL) 0.1 MG/GM VAGINAL CREAM    Insert 1g vaginally twice weekly.    FLUOXETINE (PROZAC) 40 MG CAPSULE    TAKE 1 CAPSULE DAILY    GABAPENTIN (NEURONTIN) 100 MG CAPSULE    Take 1 capsule by mouth 6 times daily    RIZATRIPTAN (MAXALT-MLT) 10 MG DISINTEGRATING TABLET    place 1 tablet ON THE TONGUE UNTIL DISSOLVED  may repeat after 2 hours if needed *DO NOT EXCEED 3 TABLETS IN 1 DAY*    VENLAFAXINE (EFFEXOR XR) 75 MG EXTENDED RELEASE CAPSULE    Take 2 capsules by mouth daily       ALLERGIES     Asa [aspirin] and Other    FAMILYHISTORY     No family history on file.     SOCIAL HISTORY       Social History     Tobacco Use    Smoking status: Former    Smokeless tobacco: Never   Substance Use Topics    Alcohol use: Yes     Comment: SOC       SCREENINGS